# Patient Record
Sex: MALE | Race: WHITE | NOT HISPANIC OR LATINO | Employment: OTHER | URBAN - METROPOLITAN AREA
[De-identification: names, ages, dates, MRNs, and addresses within clinical notes are randomized per-mention and may not be internally consistent; named-entity substitution may affect disease eponyms.]

---

## 2018-05-24 ENCOUNTER — OFFICE VISIT (OUTPATIENT)
Dept: FAMILY MEDICINE | Facility: CLINIC | Age: 58
End: 2018-05-24
Payer: COMMERCIAL

## 2018-05-24 VITALS
HEART RATE: 56 BPM | WEIGHT: 236 LBS | RESPIRATION RATE: 17 BRPM | OXYGEN SATURATION: 97 % | TEMPERATURE: 97.6 F | HEIGHT: 73 IN | DIASTOLIC BLOOD PRESSURE: 82 MMHG | BODY MASS INDEX: 31.28 KG/M2 | SYSTOLIC BLOOD PRESSURE: 120 MMHG

## 2018-05-24 DIAGNOSIS — E66.9 OBESITY (BMI 30-39.9): ICD-10-CM

## 2018-05-24 DIAGNOSIS — K40.90 RIGHT INGUINAL HERNIA: Primary | ICD-10-CM

## 2018-05-24 PROCEDURE — 99203 OFFICE O/P NEW LOW 30 MIN: CPT | Performed by: PHYSICIAN ASSISTANT

## 2018-05-24 ASSESSMENT — PAIN SCALES - GENERAL: PAINLEVEL: NO PAIN (0)

## 2018-05-24 NOTE — PATIENT INSTRUCTIONS
Follow up with general surgery at [facility] ([phone]).       At [facility], we strive to deliver an exceptional experience to you, every time we see you.  If you receive a survey in the mail, please send us back your thoughts. We really do value your feedback.    Suggested websites for health information:  Www.Localize Direct.org : Up to date and easily searchable information on multiple topics.  Www.medlineplus.gov : medication info, interactive tutorials, watch real surgeries online  Www.familydoctor.org : good info from the Academy of Family Physicians  Www.cdc.gov : public health info, travel advisories, epidemics (H1N1)  Www.aap.org : children's health info, normal development, vaccinations  Www.health.[facility].[place].us : [place] dept of health, public health issues in [place], N1N1    Your care team:     Family Medicine   WILL Meyers MD Emily Bunt, SUDHEER [facility]   S. MD Tierra Tabares MD Angela Wermerskirchen, MD         Clinic hours: Monday - Wednesday 7 am-7 pm   Thursdays and Fridays 7 am-5 pm.     [facility]: Monday - Friday 11 am-9 pm,   Saturday and Sunday 9 am-5 pm.    [facility]: Monday -Thursday 8 am-8 pm; Friday 8 am-6 pm; Saturday and Sunday 9 am-5 pm.     [facility]: Monday Thursday 8 am   7 pm; Friday 8 am   6 pm    Clinic: [phone]   [facility]: [phone]   [facility]: [phone]

## 2018-05-24 NOTE — MR AVS SNAPSHOT
After Visit Summary   5/24/2018    Bang Donis    MRN: [id number]           Patient Information     Date Of Birth          1960        Visit Information        Provider Department      5/24/2018 2:40 PM Sanaz Hauser PA-C [facility]        Today's Diagnoses     Right inguinal hernia    -  1      Care Instructions    Follow up with general surgery at [facility] ([phone]).       At [facility], we strive to deliver an exceptional experience to you, every time we see you.  If you receive a survey in the mail, please send us back your thoughts. We really do value your feedback.    Suggested websites for health information:  Www.[facility].Skiipi : Up to date and easily searchable information on multiple topics.  Www.[employer].gov : medication info, interactive tutorials, watch real surgeries online  Www.familydoctor.org : good info from the Academy of Family Physicians  Www.cdc.gov : public health info, travel advisories, epidemics (H1N1)  Www.aap.org : children's health info, normal development, vaccinations  Www.health.[facility].[place].us : [place] dept of health, public health issues in [place], N1N1    Your care team:     Family Medicine   WILL Meyers MD Emily Bunt, APRN CNP   S. MD Tierra Tabares MD Angela Wermerskirchen, MD         Clinic hours: Monday - Wednesday 7 am-7 pm   Thursdays and Fridays 7 am-5 pm.     [facility]: Monday - Friday 11 am-9 pm,   Saturday and Sunday 9 am-5 pm.    [facility]: Monday -Thursday 8 am-8 pm; Friday 8 am-6 pm; Saturday and Sunday 9 am-5 pm.     [facility]: Monday - Thursday 8 am - 7 pm; Friday 8 am - 6 pm    Clinic: [phone]   [facility]: [phone]   [facility]: [phone]                  Follow-ups after your visit        Additional Services      "GENERAL SURG ADULT REFERRAL       Your provider has referred you to: [facility]: [facility] [phone]   [email or web address]    Please be aware that coverage of these services is subject to the terms and limitations of your health insurance plan.  Call member services at your health plan with any benefit or coverage questions.      Please bring the following with you to your appointment:    (1) Any X-Rays, CTs or MRIs which have been performed.  Contact the facility where they were done to arrange for  prior to your scheduled appointment.   (2) List of current medications   (3) This referral request   (4) Any documents/labs given to you for this referral                  Who to contact     If you have questions or need follow up information about today's clinic visit or your schedule please contact [facility] directly at [phone].  Normal or non-critical lab and imaging results will be communicated to you by MyChart, letter or phone within 4 business days after the clinic has received the results. If you do not hear from us within 7 days, please contact the clinic through [employer] or phone. If you have a critical or abnormal lab result, we will notify you by phone as soon as possible.  Submit refill requests through [employer] or call your pharmacy and they will forward the refill request to us. Please allow 3 business days for your refill to be completed.          Additional Information About Your Visit        [employer]ep Information     [employer] lets you send messages to your doctor, view your test results, renew your prescriptions, schedule appointments and more. To sign up, go to [email or web address] . Click on \"Log in\" on the left side of the screen, which will take you to the Welcome page. Then click on \"Sign up Now\" on the right side of the page.     You will be asked to enter the access code listed " "below, as well as some personal information. Please follow the directions to create your username and password.     Your access code is: [email or web address]  Expires: 2018  2:42 PM     Your access code will  in 90 days. If you need help or a new code, please call your [facility] or [phone].        Care EveryWhere ID     This is your Care EveryWhere ID. This could be used by other organizations to access your [facility] medical records  [id number]        Your Vitals Were     Pulse Temperature Respirations Height Pulse Oximetry BMI (Body Mass Index)    56 97.6  F (36.4  C) (Oral) 17 1.861 m (6' 1.25\") 97% 30.92 kg/m2       Blood Pressure from Last 3 Encounters:   18 120/82   12 134/78   12/10/12 126/78    Weight from Last 3 Encounters:   18 107 kg (236 lb)   12/10/12 118.9 kg (262 lb 3.2 oz)   12 113.4 kg (250 lb)              We Performed the Following     GENERAL SURG ADULT REFERRAL          Today's Medication Changes          These changes are accurate as of 18  2:59 PM.  If you have any questions, ask your nurse or doctor.               Stop taking these medicines if you haven't already. Please contact your care team if you have questions.     NO ACTIVE MEDICATIONS   Stopped by:  Sanaz Hauser PA-C                    Primary Care Provider Office Phone # Fax #    [facility] [phone] [address] [address]        Equal Access to Services     [facility]: Hadii michelle tyson Sodylan, waaxda luqadaha, qaybta kaalmacathy meza . So [facility] [phone].    ATENCIÓN: Si habla español, tiene a rosales disposición servicios gratuitos de asistencia lingüística. Llame al [phone].    We comply with applicable federal civil rights laws and [place] laws. We do not discriminate on the basis of race, color, national origin, age, disability, sex, sexual orientation, or gender " identity.            Thank you!     Thank you for choosing [facility]  for your care. Our goal is always to provide you with excellent care. Hearing back from our patients is one way we can continue to improve our services. Please take a few minutes to complete the written survey that you may receive in the mail after your visit with us. Thank you!             Your Updated Medication List - Protect others around you: Learn how to safely use, store and throw away your medicines at www.disposemymeds.org.      Notice  As of 5/24/2018  2:59 PM    You have not been prescribed any medications.

## 2018-05-24 NOTE — PROGRESS NOTES
SUBJECTIVE:   Bang Donis is a 58 year old male who presents to clinic today for the following health issues:      Possible Hernia    Onset: 1 1/2 weeks    Description:   Location: right groin  Character: none    Accompanying Signs & Symptoms:  Other symptoms: lump in the groin    History:   Previous similar pain: no       Precipitating factors:   Trauma or overuse: no     Alleviating factors:  Improved by: n/a    Therapies Tried and outcome: compression shorts      Compression shorts seemed  To help. Not a lot of pain with it.  Does golf and carried golf bag and some discomfort.  Unable to recall trauma or injury.  No history of surgery    Does labs through work and cholesterol and blood sugar have been normal.     Works from home.  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  Additional history: as documented    Patient Active Problem List   Diagnosis     CARDIOVASCULAR SCREENING; LDL GOAL LESS THAN 160     Past Surgical History:   Procedure Laterality Date     COLONOSCOPY  7/9/2012    Procedure: COLONOSCOPY;  Colonoscopy ;  Surgeon: Homar Leger MD;  Location:  OR       Social History   Substance Use Topics     Smoking status: Never Smoker     Smokeless tobacco: Never Used     Alcohol use Yes      Comment: occasional     Family History   Problem Relation Age of Onset     C.A.D. Father      Colon Cancer Father      in 80s     Asthma Brother      Colon Cancer Sister      63      DIABETES No family hx of      Hypertension No family hx of      CEREBROVASCULAR DISEASE No family hx of      Breast Cancer No family hx of      Cancer - colorectal No family hx of      Prostate Cancer No family hx of          No current outpatient prescriptions on file.     No Known Allergies    Reviewed and updated as needed this visit by clinical staff  Tobacco  Allergies  Meds  Med Hx  Surg Hx  Fam Hx  Soc Hx      Reviewed and updated as  "needed this visit by Provider  Tobacco  Allergies  Meds  Problems  Med Hx  Surg Hx  Fam Hx  Soc Hx          ROS:  Constitutional, HEENT, cardiovascular, pulmonary, gi and gu systems are negative, except as otherwise noted.    OBJECTIVE:     /82 (BP Location: Right arm, Patient Position: Chair, Cuff Size: Adult Large)  Pulse 56  Temp 97.6  F (36.4  C) (Oral)  Resp 17  Ht 1.861 m (6' 1.25\")  Wt 107 kg (236 lb)  SpO2 97%  BMI 30.92 kg/m2  Body mass index is 30.92 kg/(m^2).  GENERAL: healthy, alert and no distress  NECK: no adenopathy, no asymmetry, masses, or scars and thyroid normal to palpation  RESP: lungs clear to auscultation - no rales, rhonchi or wheezes  CV: regular rate and rhythm, normal S1 S2, no S3 or S4, no murmur, click or rub, no peripheral edema and peripheral pulses strong  ABDOMEN: soft, nontender, no hepatosplenomegaly, no masses and bowel sounds normal   (male): testicles normal without atrophy or masses and hernia right inguinal large easily reducible   MS: no gross musculoskeletal defects noted, no edema    Diagnostic Test Results:  none     ASSESSMENT/PLAN:         BMI:   Estimated body mass index is 30.92 kg/(m^2) as calculated from the following:    Height as of this encounter: 1.861 m (6' 1.25\").    Weight as of this encounter: 107 kg (236 lb).   Weight management plan: Discussed healthy diet and exercise guidelines and patient will follow up in 12 months in clinic to re-evaluate.      1. Right inguinal hernia  Easily reducible but large size.  Reviewed risks of strangulation and signs and symptoms warranting urgent evaluation.  He would like to postpone surgery as long as he can- at least after the summer months .  Advised that discussion is more appropriate for surgeon   - GENERAL SURG ADULT REFERRAL    2. Obesity (BMI 30-39.9)  Is very active and reports diet is good.           Sanaz Hauser PA-C  [facility]  "

## 2018-06-04 ENCOUNTER — OFFICE VISIT (OUTPATIENT)
Dept: SURGERY | Facility: CLINIC | Age: 58
End: 2018-06-04
Attending: PHYSICIAN ASSISTANT
Payer: COMMERCIAL

## 2018-06-04 ENCOUNTER — TELEPHONE (OUTPATIENT)
Dept: SURGERY | Facility: CLINIC | Age: 58
End: 2018-06-04

## 2018-06-04 VITALS
DIASTOLIC BLOOD PRESSURE: 78 MMHG | BODY MASS INDEX: 31.42 KG/M2 | HEART RATE: 62 BPM | SYSTOLIC BLOOD PRESSURE: 127 MMHG | HEIGHT: 73 IN | WEIGHT: 237.1 LBS

## 2018-06-04 DIAGNOSIS — K40.90 RIGHT INGUINAL HERNIA: Primary | ICD-10-CM

## 2018-06-04 PROCEDURE — 99243 OFF/OP CNSLTJ NEW/EST LOW 30: CPT | Performed by: SURGERY

## 2018-06-04 ASSESSMENT — PAIN SCALES - GENERAL: PAINLEVEL: MILD PAIN (2)

## 2018-06-04 NOTE — PATIENT INSTRUCTIONS
Surgery Instructions    Always follow your surgeon s instructions. If you don t, your surgery could be cancelled. Please use the following checklist.  Your surgery is on: The surgery scheduler will contact you within 1 week of your consult with the surgeon. If you do not hear from them, please call the clinic or RN Care Coordinator for your provider.    Time: Prearrival times can vary depending on location/type of surgery.  [place] - 2 hour pre-arrival  [facility] - 2 hour pre-arrival  [place] - 1 hour pre-arrival    Note:  These times may change. A nurse will call you before surgery to confirm. If you have not received a call or if you have more questions, please call us on the working day before your surgery:  ? [place]: [phone] or [phone] (9am to 5:30pm)  ? [facility]: [phone] (8am to 6pm)  ? [place]: [phone] (9am to 5pm)  ? [facility] [phone] (7am to 4pm)  Prior to surgery  ? Have a pre-op physical exam with your Primary Doctor within 30 days of surgery  - Ask your doctor to send all of your results to the surgery center/hospital before surgery. Your doctor also may ask you to bring the results with you on the day of surgery.  - Tell your doctor if:  - You are allergic to latex or rubber (latex and rubber gloves are often used in medical care).  - You are taking any medicines (including aspirin), vitamins, or herbal products. You may need to stop taking some medicines before surgery.  - You have any medical problems (allergies, diabetes, or heart disease, for example).  - You have a pacemaker or an AICD (automatic implanted cardiac defibrillator). If you do, please bring the ID card with you on the day of surgery.  - People who smoke have a higher risk of infection after surgery. Ask your doctor how you can quit smoking.  - If you Primary Doctor is not within the [employer] system, you will need to have your pre-op physical faxed to us to be scanned into your  chart.  - [place]: [phone] or [phone]  - [facility] ([place]): [phone]  - [facility] [phone]  ? Call your insurance company. Ask if you need pre-approval for your surgery. If you do not have insurance, please let us know. If you wish to speak to the , please alert the clinic staff so this can be arranged.  ? Arrange for someone to drive you home after surgery.  will need to be a responsible adult (18 years or older) that will provide transportation to and from surgery and stay in the waiting room during your surgery. You may not drive yourself or take public transportation to and from surgery.  ? Arrange for someone to stay with you for 24 hours after you go home. This person must be a responsible adult (18 years or older).  ? Call your surgeon or their nurse if there is any change in your health (cold, flu, infections, hospitalizations).  ? Do not smoke, drink alcohol, or take over-the-counter medicine for 24 hours before and after surgery.  ? If you take prescribed drugs, you may need to stop them until after the surgery.  Discuss what medications to take or not take prior to surgery with your Primary Doctor at your pre-op physical. Avoid over-the-counter blood-thinning medications such as Aspirin, Ibuprofen, vitamin E, or fish oil 7 days prior to surgery (unless otherwise directed by your Primary Doctor). Tylenol is a good alternative for mild pain relief prior to surgery.  ? Eating and drinking guidelines prior to surgery:  - Stop all solid food consumption 8 hours prior to surgery  - You may drink clear liquids up to 2 hours prior to surgery (water, fruit juices without pulp, jello, tea/coffee without creamer, sports drinks, clear-fat free broth (bouillon or consomme), popsicles (without milk, bits of fruit, or seeds/nuts)  ? Follow instructions given for showering or bathing before surgery.    - Use 8 ounces of antiseptic surgical soap,  like:  - Hibiclens, Scrub Care, or Exidine  - You can find it at your local pharmacy, clinic, or retail store. If you have trouble, ask your pharmacist to help you find the right substitute.  - Please wash with one of the above soaps twice before coming to the hospital for your surgery. This will decrease bacteria (germs) on your skin. It will also help reduce your chance of infection after surgery.  - Items you will need for showering:  - 4 newly washed washcloths  - 2 newly washed towels  - 8 ounces of one of the above soaps  - Following these instructions:  - The evening before surgery: Shower or bathe as you normally would, using your regular soap and a clean washcloth. Give special attention to places where your incision (surgical cut) or catheters will be. This includes your groin area. Rinse well. You may wash your hair with your regular shampoo. Next, wash your body with 4 ounces of the antiseptic soap. Use a clean, damp washcloth and gently clean your body (from the chin down). If your surgery involves your head, use the special soap on your head and scalp. Rinse well and dry off using a newly washed towel.  - The morning of surgery: Repeat the same process as the evening shower.  - Other suggestions:    Do not shave within 12 inches of your incision (surgical cut) area for at least 3 days before surgery. Shaving can make small cuts in the skin. This puts you at higher risk of infection.    Wear freshly washed pajamas or clothing after your evening shower.    Wear freshly washed clothes the day of surgery.    Wash and change your bed sheets the day before surgery to have clean bed sheets after your shower and when you get home from surgery.    If you have trouble washing all areas, make sure someone helps you.    Don't use any deodorant, lotion or powder after your shower.    Women who are menstruating should wear a fresh sanitary pad to the hospital.  ? Do not wear or add deodorant, cologne, lotion,  makeup, nail polish or jewelry to surgery. If you wear fake nails, please remove at least one nail before coming to surgery (an oxygen monitor needs to be placed on your finger during surgery).  ? Bring these items to the surgery center/hospital:  - Insurance card  - Money for parking and co-pays, if needed  - A list of all the medicines you take. Include vitamins, minerals, herbs, and over-the-counter drugs.  Note any drug allergies.  - A copy of your advance health care directive, if you have one. This tells us what treatment you would want--and who would make health care decisions--if you could no longer speak for yourself. You may request this form in advance or download it from [email or web address].  - A case for glasses, contact lenses, hearing aids, or dentures.  - Your inhaler or CPAP machine, if you use these at home.  ? Leave extra cash, jewelry, and other valuables at home.  When you arrive  When you get to the surgery center/hospital, you will:  ? Check in. If you are under age 18, you must be with a parent or legal guardian.  ? Sign consent forms, if you haven t already. These forms state that you know the risks and benefits of surgery. When you sign the forms, you give us permission to do the surgery. Do not sign them unless you understand what will happen during and after your surgery. If you have any questions about your surgery, ask to speak with your doctor before you sign the forms. If you don t understand the answers, ask again.  ? Receive a copy of the Patient s Bill of Rights. If you do not receive a copy, please ask for one.  ? Change into hospital clothes. Your belongings will be placed in a bag. We will return them to you after surgery.  ? Meet with the anesthesia provider. He or she will tell you what kind of anesthesia (medicine) will be used to keep you comfortable during surgery.  Remember: it s okay to remind doctors and nurses to wash their hands before touching you.  In most  cases, your surgeon will use a marker to write his or her initials on the surgery site. This ensures that the exact site is operated on.  For safety reasons, we will ask you the same questions many times. For example, we may ask your name and birth date over and over again.  Friends and family can stay with you until it s time for surgery. While you re in surgery, they will be in the waiting area. Please note that cell phones are not allowed in some patient care areas.  If you have questions about what will happen in the operating room, talk to your care team.  After surgery  We will move you to a recovery room, where we will watch you closely. If you have any pain or discomfort, tell your nurse. He or she will try to make you comfortable.  If you are staying overnight, we will move you to your hospital room after you are awake.  If you are going home, we will move you to another room. Friends and family may be able to join you. The length of time you spend in recovery depend on the type of medicine you received, your medical condition, the type of surgery you had, or your response to the anesthesia given during your procedure.  When you are discharged from the recovery room, the nurses will review instructions with you and your caregiver.  ? Please wash your hands every time you touch the wound or change bandages or dressings.  ? Do not submerge the wound in water.  You may not use a bathtub or hot tub until the wound is closed. The wait time frame is generally 2-3 weeks, but any open area can be a source of incoming bacteria, so it is better to be on the safe side and avoid water submersion until your wound is fully healed.  ? You may take a shower 24 hours after surgery. Double check with your surgeon if it is OK for water to run over the wound, whether it has been sutured, stapled, glued, or is open. You may gently wash the wound using the antiseptic soap provided for your pre-surgery showering (do not use a  washcloth). Any mild soap will work as well.  ? Many surgical wounds will have small white strips of tape on them called steri-strips.  Do not remove these. The edges will curl and fall off within 7-10 days with normal showering.  ? If you are going home with sutures (stitches) or staples, you must return to the clinic to have them taken out, usually within 1-2 weeks. Some stitches are dissolvable and do not require removal. Make sure to clarify with your surgeon or surgery nurse reviewing discharge paperwork what kind of sutures you have.  ? Signs and symptoms of infection include:  - Fever, temperature over 101.5   F  - Redness  - Swelling  - Increased pain  - Green or yellow drainage which may or may not have a foul odor  Dealing with pain  A nurse will check your comfort level often during your stay. He or she will work with you to manage your pain.  Remember:  ? All pain is real. There are many ways to control pain. We can help you decide what works best for you.  ? Ask for pain medicine when you need it. Don t try to  tough it out --this can make you feel worse. Always take your medicine as ordered.  ? Medicine doesn t work the same for everyone. If your medicine isn t working, tell your nurse. There may be other medicines or treatments we can try.  Going home  We will let you know when you re ready to leave the surgery center or hospital. Before you leave, we will tell you how to care for yourself at home and prevent infections. If you do not understand something, please say so. We will answer any questions you have. We will then help you get ready to leave.  Remember, you must have a responsible adult (18 years or older) to stay with you 24 hours after you leave the hospital.  Take it easy when you get home. You will need some time to recover--you may be more tired than you realize at first. Rest and relax for at least the first 24 hours at home. You ll feel better and heal faster if you take good care of  yourself.  Follow the discharge instructions that are given to you when you leave the surgery center or hospital  Please call the clinic if you experience any problems during regular clinic hours (Monday-Friday 8:00am-5:00pm).  If you experience problems during non-clinic hours, please call the [facility] on-call line at [phone] and ask the  to page the on-call Provider for your specialty. The on-call Provider will call you back and can triage your symptoms and further advise. If you are having an emergency, always call 911 or seek immediate evaluation at the Emergency Room.  Locations  [facility]  Same-day surgery center - 2nd floor, check-in #5  [address] [place] [address]  [phone]  [email or web address] [place]  [address] [place] [id number]  [phone]   [email or web address]

## 2018-06-04 NOTE — MR AVS SNAPSHOT
After Visit Summary   6/4/2018    Bang Donis    MRN: [id number]           Patient Information     Date Of Birth          1960        Visit Information        Provider Department      6/4/2018 3:30 PM Jenna Akins MD [facility]        Care Instructions    Surgery Instructions    Always follow your surgeon s instructions. If you don t, your surgery could be cancelled. Please use the following checklist.  Your surgery is on: The surgery scheduler will contact you within 1 week of your consult with the surgeon. If you do not hear from them, please call the clinic or RN Care Coordinator for your provider.    Time: Prearrival times can vary depending on location/type of surgery.  [place] - 2 hour pre-arrival  [facility] [place], [place] - 2 hour pre-arrival  [place] - 1 hour pre-arrival    Note:  These times may change. A nurse will call you before surgery to confirm. If you have not received a call or if you have more questions, please call us on the working day before your surgery:  ? [place]: [phone] or [phone] (9am to 5:30pm)  ? [facility] [place] [place]: [phone] (8am to 6pm)  ? [place]: [phone] (9am to 5pm)  ? [facility] [phone] (7am to 4pm)  Prior to surgery  ? Have a pre-op physical exam with your Primary Doctor within 30 days of surgery  - Ask your doctor to send all of your results to the surgery center/hospital before surgery. Your doctor also may ask you to bring the results with you on the day of surgery.  - Tell your doctor if:  - You are allergic to latex or rubber (latex and rubber gloves are often used in medical care).  - You are taking any medicines (including aspirin), vitamins, or herbal products. You may need to stop taking some medicines before surgery.  - You have any medical problems (allergies, diabetes, or heart disease, for example).  - You have a pacemaker or an AICD (automatic implanted cardiac defibrillator). If you do, please  bring the ID card with you on the day of surgery.  - People who smoke have a higher risk of infection after surgery. Ask your doctor how you can quit smoking.  - If you Primary Doctor is not within the [employer] system, you will need to have your pre-op physical faxed to us to be scanned into your chart.  - [place]: [phone] or [phone]  - [facility] ([place]): [phone]  - [facility] ([facility]): [phone]  ? Call your insurance company. Ask if you need pre-approval for your surgery. If you do not have insurance, please let us know. If you wish to speak to the , please alert the clinic staff so this can be arranged.  ? Arrange for someone to drive you home after surgery.  will need to be a responsible adult (18 years or older) that will provide transportation to and from surgery and stay in the waiting room during your surgery. You may not drive yourself or take public transportation to and from surgery.  ? Arrange for someone to stay with you for 24 hours after you go home. This person must be a responsible adult (18 years or older).  ? Call your surgeon or their nurse if there is any change in your health (cold, flu, infections, hospitalizations).  ? Do not smoke, drink alcohol, or take over-the-counter medicine for 24 hours before and after surgery.  ? If you take prescribed drugs, you may need to stop them until after the surgery.  Discuss what medications to take or not take prior to surgery with your Primary Doctor at your pre-op physical. Avoid over-the-counter blood-thinning medications such as Aspirin, Ibuprofen, vitamin E, or fish oil 7 days prior to surgery (unless otherwise directed by your Primary Doctor). Tylenol is a good alternative for mild pain relief prior to surgery.  ? Eating and drinking guidelines prior to surgery:  - Stop all solid food consumption 8 hours prior to surgery  - You may drink clear liquids up to 2 hours prior to surgery  (water, fruit juices without pulp, jello, tea/coffee without creamer, sports drinks, clear-fat free broth (bouillon or consomme), popsicles (without milk, bits of fruit, or seeds/nuts)  ? Follow instructions given for showering or bathing before surgery.    - Use 8 ounces of antiseptic surgical soap, like:  - Hibiclens, Scrub Care, or Exidine  - You can find it at your local pharmacy, clinic, or retail store. If you have trouble, ask your pharmacist to help you find the right substitute.  - Please wash with one of the above soaps twice before coming to the hospital for your surgery. This will decrease bacteria (germs) on your skin. It will also help reduce your chance of infection after surgery.  - Items you will need for showering:  - 4 newly washed washcloths  - 2 newly washed towels  - 8 ounces of one of the above soaps  - Following these instructions:  - The evening before surgery: Shower or bathe as you normally would, using your regular soap and a clean washcloth. Give special attention to places where your incision (surgical cut) or catheters will be. This includes your groin area. Rinse well. You may wash your hair with your regular shampoo. Next, wash your body with 4 ounces of the antiseptic soap. Use a clean, damp washcloth and gently clean your body (from the chin down). If your surgery involves your head, use the special soap on your head and scalp. Rinse well and dry off using a newly washed towel.  - The morning of surgery: Repeat the same process as the evening shower.  - Other suggestions:    Do not shave within 12 inches of your incision (surgical cut) area for at least 3 days before surgery. Shaving can make small cuts in the skin. This puts you at higher risk of infection.    Wear freshly washed pajamas or clothing after your evening shower.    Wear freshly washed clothes the day of surgery.    Wash and change your bed sheets the day before surgery to have clean bed sheets after your shower and  when you get home from surgery.    If you have trouble washing all areas, make sure someone helps you.    Don't use any deodorant, lotion or powder after your shower.    Women who are menstruating should wear a fresh sanitary pad to the hospital.  ? Do not wear or add deodorant, cologne, lotion, makeup, nail polish or jewelry to surgery. If you wear fake nails, please remove at least one nail before coming to surgery (an oxygen monitor needs to be placed on your finger during surgery).  ? Bring these items to the surgery center/hospital:  - Insurance card  - Money for parking and co-pays, if needed  - A list of all the medicines you take. Include vitamins, minerals, herbs, and over-the-counter drugs.  Note any drug allergies.  - A copy of your advance health care directive, if you have one. This tells us what treatment you would want--and who would make health care decisions--if you could no longer speak for yourself. You may request this form in advance or download it from [email or web address].  - A case for glasses, contact lenses, hearing aids, or dentures.  - Your inhaler or CPAP machine, if you use these at home.  ? Leave extra cash, jewelry, and other valuables at home.  When you arrive  When you get to the surgery center/hospital, you will:  ? Check in. If you are under age 18, you must be with a parent or legal guardian.  ? Sign consent forms, if you haven t already. These forms state that you know the risks and benefits of surgery. When you sign the forms, you give us permission to do the surgery. Do not sign them unless you understand what will happen during and after your surgery. If you have any questions about your surgery, ask to speak with your doctor before you sign the forms. If you don t understand the answers, ask again.  ? Receive a copy of the Patient s Bill of Rights. If you do not receive a copy, please ask for one.  ? Change into hospital clothes. Your belongings will be placed in a bag.  We will return them to you after surgery.  ? Meet with the anesthesia provider. He or she will tell you what kind of anesthesia (medicine) will be used to keep you comfortable during surgery.  Remember: it s okay to remind doctors and nurses to wash their hands before touching you.  In most cases, your surgeon will use a marker to write his or her initials on the surgery site. This ensures that the exact site is operated on.  For safety reasons, we will ask you the same questions many times. For example, we may ask your name and birth date over and over again.  Friends and family can stay with you until it s time for surgery. While you re in surgery, they will be in the waiting area. Please note that cell phones are not allowed in some patient care areas.  If you have questions about what will happen in the operating room, talk to your care team.  After surgery  We will move you to a recovery room, where we will watch you closely. If you have any pain or discomfort, tell your nurse. He or she will try to make you comfortable.  If you are staying overnight, we will move you to your hospital room after you are awake.  If you are going home, we will move you to another room. Friends and family may be able to join you. The length of time you spend in recovery depend on the type of medicine you received, your medical condition, the type of surgery you had, or your response to the anesthesia given during your procedure.  When you are discharged from the recovery room, the nurses will review instructions with you and your caregiver.  ? Please wash your hands every time you touch the wound or change bandages or dressings.  ? Do not submerge the wound in water.  You may not use a bathtub or hot tub until the wound is closed. The wait time frame is generally 2-3 weeks, but any open area can be a source of incoming bacteria, so it is better to be on the safe side and avoid water submersion until your wound is fully  healed.  ? You may take a shower 24 hours after surgery. Double check with your surgeon if it is OK for water to run over the wound, whether it has been sutured, stapled, glued, or is open. You may gently wash the wound using the antiseptic soap provided for your pre-surgery showering (do not use a washcloth). Any mild soap will work as well.  ? Many surgical wounds will have small white strips of tape on them called steri-strips.  Do not remove these. The edges will curl and fall off within 7-10 days with normal showering.  ? If you are going home with sutures (stitches) or staples, you must return to the clinic to have them taken out, usually within 1-2 weeks. Some stitches are dissolvable and do not require removal. Make sure to clarify with your surgeon or surgery nurse reviewing discharge paperwork what kind of sutures you have.  ? Signs and symptoms of infection include:  - Fever, temperature over 101.5   F  - Redness  - Swelling  - Increased pain  - Green or yellow drainage which may or may not have a foul odor  Dealing with pain  A nurse will check your comfort level often during your stay. He or she will work with you to manage your pain.  Remember:  ? All pain is real. There are many ways to control pain. We can help you decide what works best for you.  ? Ask for pain medicine when you need it. Don t try to  tough it out --this can make you feel worse. Always take your medicine as ordered.  ? Medicine doesn t work the same for everyone. If your medicine isn t working, tell your nurse. There may be other medicines or treatments we can try.  Going home  We will let you know when you re ready to leave the surgery center or hospital. Before you leave, we will tell you how to care for yourself at home and prevent infections. If you do not understand something, please say so. We will answer any questions you have. We will then help you get ready to leave.  Remember, you must have a responsible adult (18 years or  older) to stay with you 24 hours after you leave the hospital.  Take it easy when you get home. You will need some time to recover--you may be more tired than you realize at first. Rest and relax for at least the first 24 hours at home. You ll feel better and heal faster if you take good care of yourself.  Follow the discharge instructions that are given to you when you leave the surgery center or hospital  Please call the clinic if you experience any problems during regular clinic hours (Monday-Friday 8:00am-5:00pm).  If you experience problems during non-clinic hours, please call the [facility] on-call line at [phone] and ask the  to page the on-call Provider for your specialty. The on-call Provider will call you back and can triage your symptoms and further advise. If you are having an emergency, always call 911 or seek immediate evaluation at the Emergency Room.  Locations  [facility]  Same-day surgery center - 2nd floor, check-in #4 [address] [place] [address]  [phone]  [email or web address] ([facility])  [address] [place] [id number] [phone]   [email or web address]                    Follow-ups after your visit        Who to contact     If you have questions or need follow up information about today's clinic visit or your schedule please contact [facility] directly at [phone].  Normal or non-critical lab and imaging results will be communicated to you by MyChart, letter or phone within 4 business days after the clinic has received the results. If you do not hear from us within 7 days, please contact the clinic through MyChart or phone. If you have a critical or abnormal lab result, we will notify you by phone as soon as possible.  Submit refill requests through [employer] or call your pharmacy  "and they will forward the refill request to us. Please allow 3 business days for your refill to be completed.          Additional Information About Your Visit        [employer] Information     [employer] gives you secure access to your electronic health record. If you see a primary care provider, you can also send messages to your care team and make appointments. If you have questions, please call your primary care clinic.  If you do not have a primary care provider, please call [phone] and they will assist you.      [employer] is an electronic gateway that provides easy, online access to your medical records. With [employer], you can request a clinic appointment, read your test results, renew a prescription or communicate with your care team.     To access your existing account, please contact your [facility] or call [phone] for assistance.        Care EveryWhere ID     This is your Care EveryWhere ID. This could be used by other organizations to access your [facility] medical records  [id number]        Your Vitals Were     Pulse Height BMI (Body Mass Index)             62 1.861 m (6' 1.25\") 31.07 kg/m2          Blood Pressure from Last 3 Encounters:   06/04/18 127/78   05/24/18 120/82   12/12/12 134/78    Weight from Last 3 Encounters:   06/04/18 107.5 kg (237 lb 1.6 oz)   05/24/18 107 kg (236 lb)   12/10/12 118.9 kg (262 lb 3.2 oz)              Today, you had the following     No orders found for display       Primary Care Provider Office Phone # Fax #    [facility] [phone]       [address] [place] [id number]        Equal Access to Services     GÓMEZ LUIS : Hadii aad ku hadasho Soomaali, waaxda luqadaha, qaybta kaalmada adememe, cathy chong. So [facility] [phone].    ATENCIÓN: Si habla español, tiene a rosales disposición servicios gratuitos de asistencia lingüística. Llame al [phone].    We comply with " applicable federal civil rights laws and [place] laws. We do not discriminate on the basis of race, color, national origin, age, disability, sex, sexual orientation, or gender identity.            Thank you!     Thank you for choosing [facility]  for your care. Our goal is always to provide you with excellent care. Hearing back from our patients is one way we can continue to improve our services. Please take a few minutes to complete the written survey that you may receive in the mail after your visit with us. Thank you!             Your Updated Medication List - Protect others around you: Learn how to safely use, store and throw away your medicines at www.disposemymeds.org.      Notice  As of 6/4/2018  3:48 PM    You have not been prescribed any medications.

## 2018-06-04 NOTE — TELEPHONE ENCOUNTER
Procedure name(s) - multi select laparoscopic right inguinal hernia repair, possible bilateral    Is this a multi surgeon case? No    Laterality Right    Reason for procedure Symptomatic right inguinal hernia    Urgency of Surgery Patient Choice/Next Available    Location of Case: [facility]    Surgeon Procedure Time (incision to closure) in minutes (per procedure as applicable) 90    Note:  Surgical Case Time Needed (in minutes)   Patient Class (for admit prior to surgery, specify number of days in comments): Same day (surgery center outpatient)    Anesthesia General    Post-Op Appointment 4 weeks    Vendor Needed? No      BMI- 31.33.     Amorrrobert Womack, CMA

## 2018-06-04 NOTE — LETTER
6/4/2018         RE: Bang Donis  [address]  [place] [address]        Dear Colleague,    Thank you for referring your patient, Bang Donis, to the [facility]. Please see a copy of my visit note below.    Chief Complaint: right groin bulge    History of Present Illness:   58 year old male sent in consultation by Sanaz Hauser PA-C for evaluation of a right groin protrusion which was noted about 3 weeks ago. He had minimal pain, and had noted some discomfort with carrying his golf bag. This was partly alleviated with the use of compression shorts. Over the last weekend, when he was busy and active all day, he noted increasnig burning discomfort which was alleviated with rest.  He denies any radiation of the pain and denies any episodes of incarceration.       No past medical history on file.  Past Surgical History:   Procedure Laterality Date     COLONOSCOPY  7/9/2012    Procedure: COLONOSCOPY;  Colonoscopy ;  Surgeon: Homar Leger MD;  Location:  OR     No current outpatient prescriptions on file.     No current facility-administered medications for this visit.       No Known Allergies  Social History     Social History     Marital status:      Spouse name: N/A     Number of children: N/A     Years of education: N/A     Occupational History     computer programer [employer]     Social History Main Topics     Smoking status: Never Smoker     Smokeless tobacco: Never Used     Alcohol use Yes      Comment: occasional     Drug use: No     Sexual activity: Yes     Partners: Female     Other Topics Concern      Service No     Blood Transfusions No     Caffeine Concern No     Occupational Exposure No     Hobby Hazards No     Sleep Concern No     Stress Concern No     Weight Concern No     Special Diet No     Back Care No     Exercise Yes     4-5 times per week     Seat Belt Yes     Self-Exams No     Social History Narrative     Family History  "  Problem Relation Age of Onset     C.A.D. Father      Colon Cancer Father      in 80s     Asthma Brother      Colon Cancer Sister      63      DIABETES No family hx of      Hypertension No family hx of      CEREBROVASCULAR DISEASE No family hx of      Breast Cancer No family hx of      Cancer - colorectal No family hx of      Prostate Cancer No family hx of            Review of Systems:  No chest pain, dyspnea, fevers or night sweats. Lost 60 lbs several years ago, and has maintained it  All other systems questioned and negative.     Exam:  Vital signs for exam: /78  Pulse 62  Ht 1.861 m (6' 1.25\")  Wt 107.5 kg (237 lb 1.6 oz)  BMI 31.07 kg/m2  Constitutional: healthy, alert and no distress.  Head: Normocephalic. No masses, lesions, tenderness or abnormalities.  ENT: ENT exam normal, no neck nodes or sinus tenderness.  Cardiovascular: Normal S1, S2, no murmurs  Respiratory: Clear to auscultation.   Gastrointestinal:  Abdomen soft, non-tender. No masses, organomegaly.  : reducible right inguinal hernia, normal testicular exam  Musculoskeletal: extremities normal- no gross deformities noted and normal muscle tone  Skin: no jaundice, rashes or petechiae.   Neurologic: Gait normal.  Psychiatric: Mentation appears normal and affect normal.    IMPRESSION AND PLAN:  Symptomatic right inguinal hernia in an active healthy man. We discussed options and he would like to proceed with a laparoscopic inguinal hernia repair. Risks of the procedure including visceral injury, bleeding, infection, conversion to open, recurrence, chronic groin pain, contralateral exploration and mesh placement explained to the patient who understands and is willing to proceed.      Again, thank you for allowing me to participate in the care of your patient.        Sincerely,        Jenna Akins MD    "

## 2018-06-04 NOTE — NURSING NOTE
"Bang Donis's goals for this visit include:   Chief Complaint   Patient presents with     Consult     Hernia- questions with exercise        He requests these members of his care team be copied on today's visit information: yes     PCP: [facility]    Referring Provider:  Sanaz Hauser PA-C  [address] [place] [address]    /78  Pulse 62  Ht 1.861 m (6' 1.25\")  Wt 107.5 kg (237 lb 1.6 oz)  BMI 31.07 kg/m2    Do you need any medication refills at today's visit? No     Amorrae COCO Womack        "

## 2018-06-06 NOTE — TELEPHONE ENCOUNTER
Date Scheduled: 6-18-18  Facility: [facility]  Surgeon: Dr. Akins   Post-op appointment scheduled:    Jul 30, 2018  3:00 PM CDT   Return Visit with Jenna Akins MD   [facility] ([facility]    [id number] [place] [id number]   [phone]                 scheduled?: No  Surgery packet/instructions confirmed received?  Yes  Special Considerations:   Siria Keene, Surgery Scheduling Coordinator

## 2018-06-08 ENCOUNTER — OFFICE VISIT (OUTPATIENT)
Dept: FAMILY MEDICINE | Facility: CLINIC | Age: 58
End: 2018-06-08
Payer: COMMERCIAL

## 2018-06-08 ENCOUNTER — RADIANT APPOINTMENT (OUTPATIENT)
Dept: CARDIOLOGY | Facility: CLINIC | Age: 58
End: 2018-06-08
Attending: NURSE PRACTITIONER
Payer: COMMERCIAL

## 2018-06-08 VITALS
BODY MASS INDEX: 31.54 KG/M2 | TEMPERATURE: 97.7 F | OXYGEN SATURATION: 99 % | WEIGHT: 238 LBS | RESPIRATION RATE: 16 BRPM | HEART RATE: 54 BPM | DIASTOLIC BLOOD PRESSURE: 80 MMHG | HEIGHT: 73 IN | SYSTOLIC BLOOD PRESSURE: 120 MMHG

## 2018-06-08 DIAGNOSIS — Z13.220 SCREENING FOR CHOLESTEROL LEVEL: ICD-10-CM

## 2018-06-08 DIAGNOSIS — R35.0 URINARY FREQUENCY: ICD-10-CM

## 2018-06-08 DIAGNOSIS — K40.90 RIGHT GROIN HERNIA: ICD-10-CM

## 2018-06-08 DIAGNOSIS — I77.810 MILD ASCENDING AORTA DILATATION (H): ICD-10-CM

## 2018-06-08 DIAGNOSIS — I44.0 FIRST DEGREE AV BLOCK: ICD-10-CM

## 2018-06-08 DIAGNOSIS — Z01.818 PREOP GENERAL PHYSICAL EXAM: Primary | ICD-10-CM

## 2018-06-08 DIAGNOSIS — I34.0 MITRAL VALVE INSUFFICIENCY, UNSPECIFIED ETIOLOGY: Primary | ICD-10-CM

## 2018-06-08 LAB
ANION GAP SERPL CALCULATED.3IONS-SCNC: 8 MMOL/L (ref 3–14)
BUN SERPL-MCNC: 20 MG/DL (ref 7–30)
CALCIUM SERPL-MCNC: 9 MG/DL (ref 8.5–10.1)
CHLORIDE SERPL-SCNC: 105 MMOL/L (ref 94–109)
CHOLEST SERPL-MCNC: 195 MG/DL
CO2 SERPL-SCNC: 27 MMOL/L (ref 20–32)
CREAT SERPL-MCNC: 0.94 MG/DL (ref 0.66–1.25)
ERYTHROCYTE [DISTWIDTH] IN BLOOD BY AUTOMATED COUNT: 12.9 % (ref 10–15)
GFR SERPL CREATININE-BSD FRML MDRD: 82 ML/MIN/1.7M2
GLUCOSE SERPL-MCNC: 93 MG/DL (ref 70–99)
HCT VFR BLD AUTO: 41.4 % (ref 40–53)
HDLC SERPL-MCNC: 43 MG/DL
HGB BLD-MCNC: 14.3 G/DL (ref 13.3–17.7)
LDLC SERPL CALC-MCNC: 135 MG/DL
MCH RBC QN AUTO: 31.6 PG (ref 26.5–33)
MCHC RBC AUTO-ENTMCNC: 34.5 G/DL (ref 31.5–36.5)
MCV RBC AUTO: 91 FL (ref 78–100)
NONHDLC SERPL-MCNC: 152 MG/DL
PLATELET # BLD AUTO: 189 10E9/L (ref 150–450)
POTASSIUM SERPL-SCNC: 4.3 MMOL/L (ref 3.4–5.3)
PSA SERPL-ACNC: 0.42 UG/L (ref 0–4)
RBC # BLD AUTO: 4.53 10E12/L (ref 4.4–5.9)
SODIUM SERPL-SCNC: 140 MMOL/L (ref 133–144)
TRIGL SERPL-MCNC: 85 MG/DL
TSH SERPL DL<=0.005 MIU/L-ACNC: 2.03 MU/L (ref 0.4–4)
WBC # BLD AUTO: 6 10E9/L (ref 4–11)

## 2018-06-08 PROCEDURE — 93000 ELECTROCARDIOGRAM COMPLETE: CPT | Performed by: NURSE PRACTITIONER

## 2018-06-08 PROCEDURE — 84443 ASSAY THYROID STIM HORMONE: CPT | Performed by: NURSE PRACTITIONER

## 2018-06-08 PROCEDURE — 36415 COLL VENOUS BLD VENIPUNCTURE: CPT | Performed by: NURSE PRACTITIONER

## 2018-06-08 PROCEDURE — 93306 TTE W/DOPPLER COMPLETE: CPT

## 2018-06-08 PROCEDURE — 85027 COMPLETE CBC AUTOMATED: CPT | Performed by: NURSE PRACTITIONER

## 2018-06-08 PROCEDURE — G0103 PSA SCREENING: HCPCS | Performed by: NURSE PRACTITIONER

## 2018-06-08 PROCEDURE — 99214 OFFICE O/P EST MOD 30 MIN: CPT | Performed by: NURSE PRACTITIONER

## 2018-06-08 PROCEDURE — 80061 LIPID PANEL: CPT | Performed by: NURSE PRACTITIONER

## 2018-06-08 PROCEDURE — 80048 BASIC METABOLIC PNL TOTAL CA: CPT | Performed by: NURSE PRACTITIONER

## 2018-06-08 ASSESSMENT — PAIN SCALES - GENERAL: PAINLEVEL: NO PAIN (0)

## 2018-06-08 NOTE — PATIENT INSTRUCTIONS
Before Your Surgery      Call your surgeon if there is any change in your health. This includes signs of a cold or flu (such as a sore throat, runny nose, cough, rash or fever).    Do not smoke, drink alcohol or take over the counter medicine (unless your surgeon or primary care doctor tells you to) for the 24 hours before and after surgery.    If you take prescribed drugs: Follow your doctor s orders about which medicines to take and which to stop until after surgery.    Eating and drinking prior to surgery: follow the instructions from your surgeon  Take a shower or bath the night before surgery. Use the soap your surgeon gave you to gently clean your skin. If you do not have soap from your surgeon, use your regular soap. Do not shave or scrub the surgery site.  Wear clean pajamas and have clean sheets on your bed.   Understanding First-Degree Heart Block    Heart block is a condition in which the electrical system of the heart does not work properly. Sometimes it can result in a slow heartbeat that is either regular or irregular. This may cause symptoms. There are different types of heart block that can be more or less serious. First-degree heart block is a condition in which the wiring of the heart is slow to send electrical signals but all of the signals are able to pass successfully. There is no electrical block but rather a slowing or delay of the signal. It usually does not cause problems. Often it does not need treatment.  What causes first-degree heart block?  First-degree heart block may be caused by:  Natural aging process  Damage to the heart from surgery  Damage to the heart muscle from a heart attack  Other types of heart disease that damage the heart muscle  Low thyroid levels  Electrolyte abnormalities  Inflammatory or infectious heart conditions  Other diseases, including rheumatic fever and sarcoidosis  Some medicines  In addition, well-conditioned athletes may develop first-degree heart block  from heart changes that result from exercising a lot. This is considered normal. Some babies are born with heart block. Heart block may also run in families.  What are the symptoms of first-degree heart block?  First-degree heart block often does not have any symptoms. It may be found when your healthcare provider is examining you for some other reason.  In more severe cases, people may have an uncomfortable awareness of the heartbeat.   How is first-degree heart block treated?  First-degree heart block usually doesn t need treatment. Your healthcare provider may ask you to have regular follow-up visits. You may also be asked to take your own pulse and be alert to changes in your heart rate.  What are the  complications of first-degree heart block?  In rare instances, a first-degree heart block may develop into a more serious type of heart block that results in slower heartbeats. This may cause symptoms.  When should I call my healthcare provider?  Call your healthcare provider right away if you have any of these:  Unusual tiredness  Shortness of breath  Chest pain  Weakness, dizziness, or fainting  Unusual drowsiness or confusion  Pain that gets worse  Symptoms that don t get better with treatment, or symptoms that get worse  New symptoms   Date Last Reviewed: 5/1/2016 2000-2017 [employer]. [address] [place] [address], [place] [address]. All rights reserved. This information is not intended as a substitute for professional medical care. Always follow your healthcare professional's instructions.

## 2018-06-08 NOTE — PROGRESS NOTES
[facility]  [address] [place] [address]  [phone]  Dept: [phone]    PRE-OP EVALUATION:  Today's date: 2018    Bang Donis (: 1960) presents for pre-operative evaluation assessment as requested by Dr. Akins.  He requires evaluation and anesthesia risk assessment prior to undergoing surgery/procedure for treatment of hernia.    Fax number for surgical facility: [facility]  Primary Physician: [facility]  Type of Anesthesia Anticipated: General    Patient has a Health Care Directive or Living Will:  NO    Preop Questions 2018   Who is doing your surgery? Jenna Akins   What are you having done? Hernia Operation   Facility or Hospital where procedure/surgery will be performed: [facility]   1.  Do you have a history of Heart attack, stroke, stent, coronary bypass surgery, or other heart surgery? No   2.  Do you ever have any pain or discomfort in your chest? No   3.  Do you have a history of  Heart Failure? No   4.   Are you troubled by shortness of breath when:  walking on a level surface, or up a slight hill, or at night? No   5.  Do you currently have a cold, bronchitis or other respiratory infection? No   6.  Do you have a cough, shortness of breath, or wheezing? No   7.  Do you sometimes get pains in the calves of your legs when you walk? No   8. Do you or anyone in your family have previous history of blood clots? No   9.  Do you or does anyone in your family have a serious bleeding problem such as prolonged bleeding following surgeries or cuts? No   10. Have you ever had problems with anemia or been told to take iron pills? No   11. Have you had any abnormal blood loss such as black, tarry or bloody stools? No   12. Have you ever had a blood transfusion? No   13. Have you or any of your relatives ever had problems with anesthesia? No   14. Do you have sleep apnea, excessive snoring or daytime  drowsiness? No   15. Do you have any prosthetic heart valves? No   16. Do you have prosthetic joints? No         HPI:     HPI related to upcoming procedure: right groin pain, worsens with activity.     Not good sleeper. Always has been.  Urinating more at night.   Rides a bike a lot, jogs at times, elliptical at the gym.       MEDICAL HISTORY:     Patient Active Problem List    Diagnosis Date Noted     First degree AV block 06/08/2018     Priority: Medium     Right groin hernia 06/08/2018     Priority: Medium     Urinary frequency 06/08/2018     Priority: Medium     Obesity (BMI 30-39.9) 05/24/2018     Priority: Medium     CARDIOVASCULAR SCREENING; LDL GOAL LESS THAN 160 01/31/2011     Priority: Medium      History reviewed. No pertinent past medical history.  Past Surgical History:   Procedure Laterality Date     COLONOSCOPY  7/9/2012    Procedure: COLONOSCOPY;  Colonoscopy ;  Surgeon: Homar Leger MD;  Location: [facility] OR     No current outpatient prescriptions on file.     OTC products: None, except as noted above    No Known Allergies   Latex Allergy: NO    Social History   Substance Use Topics     Smoking status: Never Smoker     Smokeless tobacco: Never Used     Alcohol use Yes      Comment: occasional     History   Drug Use No       REVIEW OF SYSTEMS:   CONSTITUTIONAL: NEGATIVE for fever, chills, change in weight  INTEGUMENTARY/SKIN: NEGATIVE for worrisome rashes, moles or lesions  EYES: NEGATIVE for vision changes or irritation  ENT/MOUTH: NEGATIVE for ear, mouth and throat problems  RESP: NEGATIVE for significant cough or SOB  BREAST: NEGATIVE for masses, tenderness or discharge  CV: NEGATIVE for chest pain, palpitations or peripheral edema  GI: NEGATIVE for nausea, abdominal pain, heartburn, or change in bowel habits  : NEGATIVE for frequency, dysuria, or hematuria  MUSCULOSKELETAL: NEGATIVE for significant arthralgias or myalgia  NEURO: NEGATIVE for weakness, dizziness or paresthesias  ENDOCRINE:  "NEGATIVE for temperature intolerance, skin/hair changes  HEME: NEGATIVE for bleeding problems  PSYCHIATRIC: NEGATIVE for changes in mood or affect    EXAM:   /80 (BP Location: Right arm, Patient Position: Sitting, Cuff Size: Adult Regular)  Pulse 54  Temp 97.7  F (36.5  C) (Oral)  Resp 16  Ht 1.861 m (6' 1.25\")  Wt 108 kg (238 lb)  SpO2 99%  BMI 31.19 kg/m2    GENERAL APPEARANCE: healthy, alert and no distress     EYES: EOMI,  PERRL     HENT: ear canals and TM's normal and nose and mouth without ulcers or lesions     NECK: no adenopathy, no asymmetry, masses, or scars and thyroid normal to palpation     RESP: lungs clear to auscultation - no rales, rhonchi or wheezes     CV: regular rates and rhythm, normal S1 S2, no S3 or S4 and no murmur, click or rub     ABDOMEN:  soft, nontender, no HSM or masses and bowel sounds normal     MS: extremities normal- no gross deformities noted, no evidence of inflammation in joints, FROM in all extremities. No groin pain today     SKIN: no suspicious lesions or rashes     NEURO: Normal strength and tone, sensory exam grossly normal, mentation intact and speech normal     PSYCH: mentation appears normal. and affect normal/bright     LYMPHATICS: No cervical adenopathy    DIAGNOSTICS:     EKG: sinus bradycardia, first degree AVB, slight widened QRS  Labs Resulted Today:   Results for orders placed or performed in visit on 06/08/18   CBC with platelets   Result Value Ref Range    WBC 6.0 4.0 - 11.0 10e9/L    RBC Count 4.53 4.4 - 5.9 10e12/L    Hemoglobin 14.3 13.3 - 17.7 g/dL    Hematocrit 41.4 40.0 - 53.0 %    MCV 91 78 - 100 fl    MCH 31.6 26.5 - 33.0 pg    MCHC 34.5 31.5 - 36.5 g/dL    RDW 12.9 10.0 - 15.0 %    Platelet Count 189 150 - 450 10e9/L   EKG 12-lead complete w/read - Clinics    Impression    SB with 1st Deg AVB, slight QRS widening, non-specific. No previous EKG to compare to.  Jocelyn Greene, APRN, NP-C         No results for input(s): HGB, PLT, INR, NA, " POTASSIUM, CR, A1C in the last 37691 hours.     IMPRESSION:   Reason for surgery/procedure: groin pain  Diagnosis/reason for consult: right groin hernia    The proposed surgical procedure is considered LOW risk.    REVISED CARDIAC RISK INDEX  The patient has the following serious cardiovascular risks for perioperative complications such as (MI, PE, VFib and 3  AV Block): He has new first degree AVB  No serious cardiac risks  INTERPRETATION: 1 risks: Class II (low risk - 0.9% complication rate)    The patient has the following additional risks for perioperative complications:  No identified additional risks      ICD-10-CM    1. Preop general physical exam Z01.818 EKG 12-lead complete w/read - Clinics     CBC with platelets     Basic metabolic panel   2. Right groin hernia K40.90    3. First degree AV block I44.0 Echocardiogram Complete     TSH with free T4 reflex   4. Urinary frequency R35.0 PSA, screen   5. Screening for cholesterol level Z13.220 Lipid panel reflex to direct LDL Fasting       RECOMMENDATIONS:       Cardiovascular Risk  New first degree AVB, will get ECHO prior to surgery, if normal okay to continue surgery, if not will need cardiology consult prior to surgery      --Patient is to take all scheduled medications on the day of surgery EXCEPT for modifications listed below. none    APPROVAL GIVEN to proceed with proposed procedure,   given ECHO is normal         Signed Electronically by: SUDHEER Manriquez, NP-C      Copy of this evaluation report is provided to requesting physician.    [place] Preop Guidelines    Revised Cardiac Risk Index

## 2018-06-08 NOTE — Clinical Note
Pre-op FYI: needs ECHO to ensure normal heart function prior to surgery. New first degree AVB noted.

## 2018-06-08 NOTE — MR AVS SNAPSHOT
After Visit Summary   6/8/2018    Bang Donis    MRN: [id number]           Patient Information     Date Of Birth          1960        Visit Information        Provider Department      6/8/2018 7:40 AM Jocelyn Greene NP [facility]        Today's Diagnoses     Preop general physical exam    -  1    Right groin hernia        First degree AV block        Urinary frequency        Screening for cholesterol level          Care Instructions      Before Your Surgery      Call your surgeon if there is any change in your health. This includes signs of a cold or flu (such as a sore throat, runny nose, cough, rash or fever).    Do not smoke, drink alcohol or take over the counter medicine (unless your surgeon or primary care doctor tells you to) for the 24 hours before and after surgery.    If you take prescribed drugs: Follow your doctor s orders about which medicines to take and which to stop until after surgery.    Eating and drinking prior to surgery: follow the instructions from your surgeon  Take a shower or bath the night before surgery. Use the soap your surgeon gave you to gently clean your skin. If you do not have soap from your surgeon, use your regular soap. Do not shave or scrub the surgery site.  Wear clean pajamas and have clean sheets on your bed.   Understanding First-Degree Heart Block    Heart block is a condition in which the electrical system of the heart does not work properly. Sometimes it can result in a slow heartbeat that is either regular or irregular. This may cause symptoms. There are different types of heart block that can be more or less serious. First-degree heart block is a condition in which the wiring of the heart is slow to send electrical signals but all of the signals are able to pass successfully. There is no electrical block but rather a slowing or delay of the signal. It usually does not cause problems. Often it does not need treatment.  What causes  first-degree heart block?  First-degree heart block may be caused by:  Natural aging process  Damage to the heart from surgery  Damage to the heart muscle from a heart attack  Other types of heart disease that damage the heart muscle  Low thyroid levels  Electrolyte abnormalities  Inflammatory or infectious heart conditions  Other diseases, including rheumatic fever and sarcoidosis  Some medicines  In addition, well-conditioned athletes may develop first-degree heart block from heart changes that result from exercising a lot. This is considered normal. Some babies are born with heart block. Heart block may also run in families.  What are the symptoms of first-degree heart block?  First-degree heart block often does not have any symptoms. It may be found when your healthcare provider is examining you for some other reason.  In more severe cases, people may have an uncomfortable awareness of the heartbeat.   How is first-degree heart block treated?  First-degree heart block usually doesn t need treatment. Your healthcare provider may ask you to have regular follow-up visits. You may also be asked to take your own pulse and be alert to changes in your heart rate.  What are the  complications of first-degree heart block?  In rare instances, a first-degree heart block may develop into a more serious type of heart block that results in slower heartbeats. This may cause symptoms.  When should I call my healthcare provider?  Call your healthcare provider right away if you have any of these:  Unusual tiredness  Shortness of breath  Chest pain  Weakness, dizziness, or fainting  Unusual drowsiness or confusion  Pain that gets worse  Symptoms that don t get better with treatment, or symptoms that get worse  New symptoms   Date Last Reviewed: 5/1/2016 2000-2017 [employer]. [address] [place] [address], [place] [address]. All rights reserved. This information is not intended as a substitute for professional medical  care. Always follow your healthcare professional's instructions.                  Follow-ups after your visit        Your next 10 appointments already scheduled     Jun 18, 2018   Procedure with Jenna Akins MD   [facility] [place] [facility])    [address] [place] [id number] [phone]            Jul 30, 2018  3:00 PM CDT   [facility] with [facility] MD   [facility]    [address]  [facility] [id number] [phone]              Future tests that were ordered for you today     Open Future Orders        Priority Expected Expires Ordered    Echocardiogram Complete Routine  6/8/2019 6/8/2018            Who to contact     If you have questions or need follow up information about today's clinic visit or your schedule please contact [facility] directly at [phone].  Normal or non-critical lab and imaging results will be communicated to you by MyChart, letter or phone within 4 business days after the clinic has received the results. If you do not hear from us within 7 days, please contact the clinic through [employer] or phone. If you have a critical or abnormal lab result, we will notify you by phone as soon as possible.  Submit refill requests through [employer] or call your pharmacy and they will forward the refill request to us. Please allow 3 business days for your refill to be completed.          Additional Information About Your Visit        [employer] Information     [employer] gives you secure access to your electronic health record. If you see a primary care provider, you can also send messages to your care team and make appointments. If you have questions, please call your primary care clinic.  If you do not have a primary care provider, please call [phone] and they will assist you.        Care EveryWhere ID     This is your Care EveryWhere ID. This could be used by other organizations to access your  "[place] medical records  [id number]        Your Vitals Were     Pulse Temperature Respirations Height Pulse Oximetry BMI (Body Mass Index)    54 97.7  F (36.5  C) (Oral) 16 1.861 m (6' 1.25\") 99% 31.19 kg/m2       Blood Pressure from Last 3 Encounters:   06/08/18 120/80   06/04/18 127/78   05/24/18 120/82    Weight from Last 3 Encounters:   06/08/18 108 kg (238 lb)   06/04/18 107.5 kg (237 lb 1.6 oz)   05/24/18 107 kg (236 lb)              We Performed the Following     Basic metabolic panel     CBC with platelets     EKG 12-lead complete w/read - Clinics     Lipid panel reflex to direct LDL Fasting     PSA, screen        Primary Care Provider Office Phone # Fax #    [facility] [phone] [address] [address]        Equal Access to Services     SONJA LUIS AH: Hadii aad ku hadasho Soomaali, waaxda luqadaha, qaybta kaalmada adeegyada, cathy toro . So [facility] [phone].    ATENCIÓN: Si michelle santos, tiene a rosales disposición servicios gratuitos de asistencia lingüística. Llame al [phone].    We comply with applicable federal civil rights laws and [place] laws. We do not discriminate on the basis of race, color, national origin, age, disability, sex, sexual orientation, or gender identity.            Thank you!     Thank you for choosing [facility]  for your care. Our goal is always to provide you with excellent care. Hearing back from our patients is one way we can continue to improve our services. Please take a few minutes to complete the written survey that you may receive in the mail after your visit with us. Thank you!             Your Updated Medication List - Protect others around you: Learn how to safely use, store and throw away your medicines at www.disposemymeds.org.      Notice  As of 6/8/2018  8:13 AM    You have not been prescribed any medications.      "

## 2018-06-11 ENCOUNTER — TELEPHONE (OUTPATIENT)
Dept: FAMILY MEDICINE | Facility: CLINIC | Age: 58
End: 2018-06-11

## 2018-06-11 NOTE — TELEPHONE ENCOUNTER
Patient have questions on his abnormal Echo result.   He would like to speak to RN. Please call and discuss.     Raine Sanchez, Medical Assistant

## 2018-06-11 NOTE — TELEPHONE ENCOUNTER
Spoke with patient and he just wanted to be sure that he was understanding recommendations correctly. He wanted to make sure it is ok for him to continue to work out daily. We reviewed symptoms to watch for and to stop of slow down with exercise if having any of these symptoms. Patient will call to get scheduled with Cardiology.     Jessica Dawson RN, BSN

## 2018-06-11 NOTE — TELEPHONE ENCOUNTER
Reason for Call:  Request for results:    Name of test or procedure: Echo    Date of test of procedure: 6/9    Location of the test or procedure: [facility]    OK to leave the result message on voice mail or with a family member? YES but wants to ask some questions     Phone number Patient can be reached at:  Home number on file [phone] (home)    Additional comments: none    Call taken on 6/11/2018 at 8:56 AM by Irina Munoz

## 2018-06-13 ENCOUNTER — ANESTHESIA EVENT (OUTPATIENT)
Dept: SURGERY | Facility: AMBULATORY SURGERY CENTER | Age: 58
End: 2018-06-13

## 2018-06-18 ENCOUNTER — HOSPITAL ENCOUNTER (OUTPATIENT)
Facility: AMBULATORY SURGERY CENTER | Age: 58
Discharge: HOME | End: 2018-06-18
Attending: SURGERY | Admitting: SURGERY
Payer: COMMERCIAL

## 2018-06-18 ENCOUNTER — SURGERY (OUTPATIENT)
Age: 58
End: 2018-06-18
Payer: COMMERCIAL

## 2018-06-18 ENCOUNTER — ANESTHESIA (OUTPATIENT)
Dept: SURGERY | Facility: AMBULATORY SURGERY CENTER | Age: 58
End: 2018-06-18
Payer: COMMERCIAL

## 2018-06-18 VITALS
TEMPERATURE: 97.8 F | RESPIRATION RATE: 8 BRPM | DIASTOLIC BLOOD PRESSURE: 67 MMHG | OXYGEN SATURATION: 97 % | SYSTOLIC BLOOD PRESSURE: 123 MMHG

## 2018-06-18 DIAGNOSIS — K40.90 RIGHT INGUINAL HERNIA: Primary | ICD-10-CM

## 2018-06-18 PROCEDURE — 49650 LAP ING HERNIA REPAIR INIT: CPT | Mod: RT | Performed by: SURGERY

## 2018-06-18 PROCEDURE — G8907 PT DOC NO EVENTS ON DISCHARG: HCPCS

## 2018-06-18 PROCEDURE — 49650 LAP ING HERNIA REPAIR INIT: CPT | Mod: RT

## 2018-06-18 PROCEDURE — G8916 PT W IV AB GIVEN ON TIME: HCPCS

## 2018-06-18 DEVICE — MESH PROGRIP LAPAROSCOPIC 5.9X3.9" PARIETEX SELF-FIX LPG1510: Type: IMPLANTABLE DEVICE | Site: ABDOMEN | Status: FUNCTIONAL

## 2018-06-18 RX ORDER — CEFAZOLIN SODIUM 1 G/3ML
1 INJECTION, POWDER, FOR SOLUTION INTRAMUSCULAR; INTRAVENOUS SEE ADMIN INSTRUCTIONS
Status: DISCONTINUED | OUTPATIENT
Start: 2018-06-18 | End: 2018-06-19 | Stop reason: HOSPADM

## 2018-06-18 RX ORDER — OXYCODONE AND ACETAMINOPHEN 5; 325 MG/1; MG/1
1-2 TABLET ORAL EVERY 4 HOURS PRN
Qty: 12 TABLET | Refills: 0 | Status: SHIPPED | OUTPATIENT
Start: 2018-06-18 | End: 2018-08-27

## 2018-06-18 RX ORDER — PROPOFOL 10 MG/ML
INJECTION, EMULSION INTRAVENOUS CONTINUOUS PRN
Status: DISCONTINUED | OUTPATIENT
Start: 2018-06-18 | End: 2018-06-18

## 2018-06-18 RX ORDER — LIDOCAINE 40 MG/G
CREAM TOPICAL
Status: DISCONTINUED | OUTPATIENT
Start: 2018-06-18 | End: 2018-06-19 | Stop reason: HOSPADM

## 2018-06-18 RX ORDER — ONDANSETRON 2 MG/ML
4 INJECTION INTRAMUSCULAR; INTRAVENOUS EVERY 30 MIN PRN
Status: DISCONTINUED | OUTPATIENT
Start: 2018-06-18 | End: 2018-06-19 | Stop reason: HOSPADM

## 2018-06-18 RX ORDER — FENTANYL CITRATE 50 UG/ML
INJECTION, SOLUTION INTRAMUSCULAR; INTRAVENOUS PRN
Status: DISCONTINUED | OUTPATIENT
Start: 2018-06-18 | End: 2018-06-18

## 2018-06-18 RX ORDER — LIDOCAINE HYDROCHLORIDE 20 MG/ML
INJECTION, SOLUTION INFILTRATION; PERINEURAL PRN
Status: DISCONTINUED | OUTPATIENT
Start: 2018-06-18 | End: 2018-06-18

## 2018-06-18 RX ORDER — BUPIVACAINE HYDROCHLORIDE AND EPINEPHRINE 2.5; 5 MG/ML; UG/ML
INJECTION, SOLUTION INFILTRATION; PERINEURAL PRN
Status: DISCONTINUED | OUTPATIENT
Start: 2018-06-18 | End: 2018-06-18 | Stop reason: HOSPADM

## 2018-06-18 RX ORDER — PROPOFOL 10 MG/ML
INJECTION, EMULSION INTRAVENOUS PRN
Status: DISCONTINUED | OUTPATIENT
Start: 2018-06-18 | End: 2018-06-18

## 2018-06-18 RX ORDER — ONDANSETRON 2 MG/ML
INJECTION INTRAMUSCULAR; INTRAVENOUS PRN
Status: DISCONTINUED | OUTPATIENT
Start: 2018-06-18 | End: 2018-06-18

## 2018-06-18 RX ORDER — DEXAMETHASONE SODIUM PHOSPHATE 4 MG/ML
INJECTION, SOLUTION INTRA-ARTICULAR; INTRALESIONAL; INTRAMUSCULAR; INTRAVENOUS; SOFT TISSUE PRN
Status: DISCONTINUED | OUTPATIENT
Start: 2018-06-18 | End: 2018-06-18

## 2018-06-18 RX ORDER — GABAPENTIN 300 MG/1
300 CAPSULE ORAL ONCE
Status: COMPLETED | OUTPATIENT
Start: 2018-06-18 | End: 2018-06-18

## 2018-06-18 RX ORDER — SODIUM CHLORIDE, SODIUM LACTATE, POTASSIUM CHLORIDE, CALCIUM CHLORIDE 600; 310; 30; 20 MG/100ML; MG/100ML; MG/100ML; MG/100ML
INJECTION, SOLUTION INTRAVENOUS CONTINUOUS
Status: DISCONTINUED | OUTPATIENT
Start: 2018-06-18 | End: 2018-06-19 | Stop reason: HOSPADM

## 2018-06-18 RX ORDER — FENTANYL CITRATE 50 UG/ML
25-50 INJECTION, SOLUTION INTRAMUSCULAR; INTRAVENOUS
Status: DISCONTINUED | OUTPATIENT
Start: 2018-06-18 | End: 2018-06-19 | Stop reason: HOSPADM

## 2018-06-18 RX ORDER — MELOXICAM 15 MG/1
15 TABLET ORAL
Status: COMPLETED | OUTPATIENT
Start: 2018-06-18 | End: 2018-06-18

## 2018-06-18 RX ORDER — CEFAZOLIN SODIUM 2 G/100ML
2 INJECTION, SOLUTION INTRAVENOUS
Status: COMPLETED | OUTPATIENT
Start: 2018-06-18 | End: 2018-06-18

## 2018-06-18 RX ORDER — HYDROMORPHONE HYDROCHLORIDE 1 MG/ML
.3-.5 INJECTION, SOLUTION INTRAMUSCULAR; INTRAVENOUS; SUBCUTANEOUS EVERY 10 MIN PRN
Status: DISCONTINUED | OUTPATIENT
Start: 2018-06-18 | End: 2018-06-19 | Stop reason: HOSPADM

## 2018-06-18 RX ORDER — EPHEDRINE SULFATE 50 MG/ML
INJECTION, SOLUTION INTRAMUSCULAR; INTRAVENOUS; SUBCUTANEOUS PRN
Status: DISCONTINUED | OUTPATIENT
Start: 2018-06-18 | End: 2018-06-18

## 2018-06-18 RX ORDER — ONDANSETRON 4 MG/1
4 TABLET, ORALLY DISINTEGRATING ORAL EVERY 30 MIN PRN
Status: DISCONTINUED | OUTPATIENT
Start: 2018-06-18 | End: 2018-06-19 | Stop reason: HOSPADM

## 2018-06-18 RX ORDER — MEPERIDINE HYDROCHLORIDE 25 MG/ML
12.5 INJECTION INTRAMUSCULAR; INTRAVENOUS; SUBCUTANEOUS
Status: DISCONTINUED | OUTPATIENT
Start: 2018-06-18 | End: 2018-06-19 | Stop reason: HOSPADM

## 2018-06-18 RX ORDER — OXYCODONE HYDROCHLORIDE 5 MG/1
5 TABLET ORAL
Status: COMPLETED | OUTPATIENT
Start: 2018-06-18 | End: 2018-06-18

## 2018-06-18 RX ORDER — NALOXONE HYDROCHLORIDE 0.4 MG/ML
.1-.4 INJECTION, SOLUTION INTRAMUSCULAR; INTRAVENOUS; SUBCUTANEOUS
Status: DISCONTINUED | OUTPATIENT
Start: 2018-06-18 | End: 2018-06-19 | Stop reason: HOSPADM

## 2018-06-18 RX ORDER — ACETAMINOPHEN 325 MG/1
975 TABLET ORAL ONCE
Status: COMPLETED | OUTPATIENT
Start: 2018-06-18 | End: 2018-06-18

## 2018-06-18 RX ADMIN — Medication 20 MG: at 12:29

## 2018-06-18 RX ADMIN — DEXAMETHASONE SODIUM PHOSPHATE 10 MG: 4 INJECTION, SOLUTION INTRA-ARTICULAR; INTRALESIONAL; INTRAMUSCULAR; INTRAVENOUS; SOFT TISSUE at 12:30

## 2018-06-18 RX ADMIN — FENTANYL CITRATE 50 MCG: 50 INJECTION, SOLUTION INTRAMUSCULAR; INTRAVENOUS at 12:02

## 2018-06-18 RX ADMIN — CEFAZOLIN SODIUM 2 G: 2 INJECTION, SOLUTION INTRAVENOUS at 11:57

## 2018-06-18 RX ADMIN — FENTANYL CITRATE 25 MCG: 50 INJECTION, SOLUTION INTRAMUSCULAR; INTRAVENOUS at 13:17

## 2018-06-18 RX ADMIN — SODIUM CHLORIDE, SODIUM LACTATE, POTASSIUM CHLORIDE, CALCIUM CHLORIDE: 600; 310; 30; 20 INJECTION, SOLUTION INTRAVENOUS at 11:28

## 2018-06-18 RX ADMIN — BUPIVACAINE HYDROCHLORIDE AND EPINEPHRINE 10 ML: 2.5; 5 INJECTION, SOLUTION INFILTRATION; PERINEURAL at 12:20

## 2018-06-18 RX ADMIN — MELOXICAM 15 MG: 15 TABLET ORAL at 11:17

## 2018-06-18 RX ADMIN — PROPOFOL 200 MCG/KG/MIN: 10 INJECTION, EMULSION INTRAVENOUS at 12:04

## 2018-06-18 RX ADMIN — GABAPENTIN 300 MG: 300 CAPSULE ORAL at 11:17

## 2018-06-18 RX ADMIN — Medication 30 MG: at 12:08

## 2018-06-18 RX ADMIN — FENTANYL CITRATE 50 MCG: 50 INJECTION, SOLUTION INTRAMUSCULAR; INTRAVENOUS at 12:21

## 2018-06-18 RX ADMIN — OXYCODONE HYDROCHLORIDE 5 MG: 5 TABLET ORAL at 13:49

## 2018-06-18 RX ADMIN — LIDOCAINE HYDROCHLORIDE 40 MG: 20 INJECTION, SOLUTION INFILTRATION; PERINEURAL at 12:02

## 2018-06-18 RX ADMIN — ONDANSETRON 4 MG: 2 INJECTION INTRAMUSCULAR; INTRAVENOUS at 12:58

## 2018-06-18 RX ADMIN — EPHEDRINE SULFATE 10 MG: 50 INJECTION, SOLUTION INTRAMUSCULAR; INTRAVENOUS; SUBCUTANEOUS at 12:15

## 2018-06-18 RX ADMIN — PROPOFOL 200 MG: 10 INJECTION, EMULSION INTRAVENOUS at 12:02

## 2018-06-18 RX ADMIN — ACETAMINOPHEN 975 MG: 325 TABLET ORAL at 11:17

## 2018-06-18 RX ADMIN — PROPOFOL 100 MG: 10 INJECTION, EMULSION INTRAVENOUS at 12:29

## 2018-06-18 NOTE — H&P (VIEW-ONLY)
[facility]  [address] [address]  [phone]  Dept: [phone]    PRE-OP EVALUATION:  Today's date: 2018    Bang Donis (: 1960) presents for pre-operative evaluation assessment as requested by Dr. Akins.  He requires evaluation and anesthesia risk assessment prior to undergoing surgery/procedure for treatment of hernia.    Fax number for surgical facility: [facility]  Primary Physician: [facility]  Type of Anesthesia Anticipated: General    Patient has a Health Care Directive or Living Will:  NO    Preop Questions 2018   Who is doing your surgery? Jenna Akins   What are you having done? Hernia Operation   Facility or Hospital where procedure/surgery will be performed: [facility]   1.  Do you have a history of Heart attack, stroke, stent, coronary bypass surgery, or other heart surgery? No   2.  Do you ever have any pain or discomfort in your chest? No   3.  Do you have a history of  Heart Failure? No   4.   Are you troubled by shortness of breath when:  walking on a level surface, or up a slight hill, or at night? No   5.  Do you currently have a cold, bronchitis or other respiratory infection? No   6.  Do you have a cough, shortness of breath, or wheezing? No   7.  Do you sometimes get pains in the calves of your legs when you walk? No   8. Do you or anyone in your family have previous history of blood clots? No   9.  Do you or does anyone in your family have a serious bleeding problem such as prolonged bleeding following surgeries or cuts? No   10. Have you ever had problems with anemia or been told to take iron pills? No   11. Have you had any abnormal blood loss such as black, tarry or bloody stools? No   12. Have you ever had a blood transfusion? No   13. Have you or any of your relatives ever had problems with anesthesia? No   14. Do you have sleep apnea, excessive snoring or daytime  drowsiness? No   15. Do you have any prosthetic heart valves? No   16. Do you have prosthetic joints? No         HPI:     HPI related to upcoming procedure: right groin pain, worsens with activity.     Not good sleeper. Always has been.  Urinating more at night.   Rides a bike a lot, jogs at times, elliptical at the gym.       MEDICAL HISTORY:     Patient Active Problem List    Diagnosis Date Noted     First degree AV block 06/08/2018     Priority: Medium     Right groin hernia 06/08/2018     Priority: Medium     Urinary frequency 06/08/2018     Priority: Medium     Obesity (BMI 30-39.9) 05/24/2018     Priority: Medium     CARDIOVASCULAR SCREENING; LDL GOAL LESS THAN 160 01/31/2011     Priority: Medium      History reviewed. No pertinent past medical history.  Past Surgical History:   Procedure Laterality Date     COLONOSCOPY  7/9/2012    Procedure: COLONOSCOPY;  Colonoscopy ;  Surgeon: Homar Leger MD;  Location: [facility] OR     No current outpatient prescriptions on file.     OTC products: None, except as noted above    No Known Allergies   Latex Allergy: NO    Social History   Substance Use Topics     Smoking status: Never Smoker     Smokeless tobacco: Never Used     Alcohol use Yes      Comment: occasional     History   Drug Use No       REVIEW OF SYSTEMS:   CONSTITUTIONAL: NEGATIVE for fever, chills, change in weight  INTEGUMENTARY/SKIN: NEGATIVE for worrisome rashes, moles or lesions  EYES: NEGATIVE for vision changes or irritation  ENT/MOUTH: NEGATIVE for ear, mouth and throat problems  RESP: NEGATIVE for significant cough or SOB  BREAST: NEGATIVE for masses, tenderness or discharge  CV: NEGATIVE for chest pain, palpitations or peripheral edema  GI: NEGATIVE for nausea, abdominal pain, heartburn, or change in bowel habits  : NEGATIVE for frequency, dysuria, or hematuria  MUSCULOSKELETAL: NEGATIVE for significant arthralgias or myalgia  NEURO: NEGATIVE for weakness, dizziness or paresthesias  ENDOCRINE:  "NEGATIVE for temperature intolerance, skin/hair changes  HEME: NEGATIVE for bleeding problems  PSYCHIATRIC: NEGATIVE for changes in mood or affect    EXAM:   /80 (BP Location: Right arm, Patient Position: Sitting, Cuff Size: Adult Regular)  Pulse 54  Temp 97.7  F (36.5  C) (Oral)  Resp 16  Ht 1.861 m (6' 1.25\")  Wt 108 kg (238 lb)  SpO2 99%  BMI 31.19 kg/m2    GENERAL APPEARANCE: healthy, alert and no distress     EYES: EOMI,  PERRL     HENT: ear canals and TM's normal and nose and mouth without ulcers or lesions     NECK: no adenopathy, no asymmetry, masses, or scars and thyroid normal to palpation     RESP: lungs clear to auscultation - no rales, rhonchi or wheezes     CV: regular rates and rhythm, normal S1 S2, no S3 or S4 and no murmur, click or rub     ABDOMEN:  soft, nontender, no HSM or masses and bowel sounds normal     MS: extremities normal- no gross deformities noted, no evidence of inflammation in joints, FROM in all extremities. No groin pain today     SKIN: no suspicious lesions or rashes     NEURO: Normal strength and tone, sensory exam grossly normal, mentation intact and speech normal     PSYCH: mentation appears normal. and affect normal/bright     LYMPHATICS: No cervical adenopathy    DIAGNOSTICS:     EKG: sinus bradycardia, first degree AVB, slight widened QRS  Labs Resulted Today:   Results for orders placed or performed in visit on 06/08/18   CBC with platelets   Result Value Ref Range    WBC 6.0 4.0 - 11.0 10e9/L    RBC Count 4.53 4.4 - 5.9 10e12/L    Hemoglobin 14.3 13.3 - 17.7 g/dL    Hematocrit 41.4 40.0 - 53.0 %    MCV 91 78 - 100 fl    MCH 31.6 26.5 - 33.0 pg    MCHC 34.5 31.5 - 36.5 g/dL    RDW 12.9 10.0 - 15.0 %    Platelet Count 189 150 - 450 10e9/L   EKG 12-lead complete w/read - Clinics    Impression    SB with 1st Deg AVB, slight QRS widening, non-specific. No previous EKG to compare to.  Jocelyn Greene, APRN, NP-C         No results for input(s): HGB, PLT, INR, NA, " POTASSIUM, CR, A1C in the last 69181 hours.     IMPRESSION:   Reason for surgery/procedure: groin pain  Diagnosis/reason for consult: right groin hernia    The proposed surgical procedure is considered LOW risk.    REVISED CARDIAC RISK INDEX  The patient has the following serious cardiovascular risks for perioperative complications such as (MI, PE, VFib and 3  AV Block): He has new first degree AVB  No serious cardiac risks  INTERPRETATION: 1 risks: Class II (low risk - 0.9% complication rate)    The patient has the following additional risks for perioperative complications:  No identified additional risks      ICD-10-CM    1. Preop general physical exam Z01.818 EKG 12-lead complete w/read - Clinics     CBC with platelets     Basic metabolic panel   2. Right groin hernia K40.90    3. First degree AV block I44.0 Echocardiogram Complete     TSH with free T4 reflex   4. Urinary frequency R35.0 PSA, screen   5. Screening for cholesterol level Z13.220 Lipid panel reflex to direct LDL Fasting       RECOMMENDATIONS:       Cardiovascular Risk  New first degree AVB, will get ECHO prior to surgery, if normal okay to continue surgery, if not will need cardiology consult prior to surgery      --Patient is to take all scheduled medications on the day of surgery EXCEPT for modifications listed below. none    APPROVAL GIVEN to proceed with proposed procedure,   given ECHO is normal         Signed Electronically by: SUDHEER Manriquez, NP-C      Copy of this evaluation report is provided to requesting physician.    [place] Preop Guidelines    Revised Cardiac Risk Index

## 2018-06-18 NOTE — IP AVS SNAPSHOT
MRN:[id number]                      After Visit Summary   6/18/2018    Bang Donis    MRN: [id number]           Thank you!     Thank you for choosing [facility] for your care. Our goal is always to provide you with excellent care. Hearing back from our patients is one way we can continue to improve our services. Please take a few minutes to complete the written survey that you may receive in the mail after you visit with us. Thank you!        Patient Information     Date Of Birth          1960        About your hospital stay     You were admitted on:  June 18, 2018 You last received care in the:  [facility]    You were discharged on:  June 18, 2018       Who to Call     For medical emergencies, please call 911.  For non-urgent questions about your medical care, please call your primary care provider or clinic, [phone]  For questions related to your surgery, please call your surgery clinic        Attending Provider     Provider Specialty    Jenna Akins MD Surgery       Primary Care Provider Office Phone # Fax #    [facility] [phone]      After Care Instructions     Diet Instructions       Resume pre-procedure diet            Discharge Instructions       Patient to follow up with appointment in 4 weeks            Do not - immerse incision in water until sutures removed       Do not immerse incision in water for 1 week            Dressing       Remove dressing in 2 days, remove steristrips as they roll off ~ 2 weeks.            Ice to affected area       Ice to operative site PRN            No Alcohol       For 24 hours post procedure            No driving or operating machinery        until the day after procedure            Shower       May shower            Weight bearing status - As tolerated                 Your next 10 appointments already scheduled     Jul 12, 2018  8:00 AM CDT   New Visit with Bacilio Lucas MD   [facility]  [facility]    [address] [facility] [address]   [phone]            2018  3:00 PM CDT   [facility]kins [facility]   [facility]    [address] [facility] [address]   [phone]              Further instructions from your care team       [facility]  Same-Day Surgery   Adult Discharge Orders & Instructions   For 24 hours after surgery  1. Get plenty of rest.  A responsible adult must stay with you for at least 24 hours after you leave the hospital.   2. Do not drive or use heavy equipment.  If you have weakness or tingling, don't drive or use heavy equipment until this feeling goes away.  3. Do not drink alcohol.  4. Avoid strenuous or risky activities.  Ask for help when climbing stairs.   5. You may feel lightheaded.  IF so, sit for a few minutes before standing.  Have someone help you get up.   6. If you have nausea (feel sick to your stomach): Drink only clear liquids such as apple juice, ginger ale, broth or 7-Up.  Rest may also help.  Be sure to drink enough fluids.  Move to a regular diet as you feel able.  7. You may have a slight fever. Call the doctor if your fever is over 100 F (37.7 C) (taken under the tongue) or lasts longer than 24 hours.  8. You may have a dry mouth, a sore throat, muscle aches or trouble sleeping.  These should go away after 24 hours.  9. Do not make important or legal decisions.   Call your doctor for any of the followin.  Signs of infection (fever, growing tenderness at the surgery site, a large amount of drainage or bleeding, severe pain, foul-smelling drainage, redness, swelling).    2. It has been over 8 to 10 hours since surgery and you are still not able to urinate (pass water).    3.  Headache for over 24 hours.        Pending Results     No orders found from 2018 to 2018.            Admission  Information     Date & Time Provider Department Dept. Phone    6/18/2018 Jenna Akins MD [facility] [phone]      Your Vitals Were     Blood Pressure Temperature Respirations Pulse Oximetry          119/62 97.9  F (36.6  C) (Temporal) 14 100%        MyChart Information     [facility] gives you secure access to your electronic health record. If you see a primary care provider, you can also send messages to your care team and make appointments. If you have questions, please call your primary care clinic.  If you do not have a primary care provider, please call [phone] and they will assist you.        Care EveryWhere ID     This is your Care EveryWhere ID. This could be used by other organizations to access your [facility] medical records  [id number]        Equal Access to Services     SONJA LUIS : Samuel Smiley, maliha means, brian ramsey, cathy chnog. So [facility] [phone].    ATENCIÓN: Si habla español, tiene a rosales disposición servicios gratuitos de asistencia lingüística. Llame al [phone].    We comply with applicable federal civil rights laws and [place] laws. We do not discriminate on the basis of race, color, national origin, age, disability, sex, sexual orientation, or gender identity.               Review of your medicines      START taking        Dose / Directions    oxyCODONE-acetaminophen 5-325 MG per tablet   Commonly known as:  PERCOCET   Used for:  Right inguinal hernia        Dose:  1-2 tablet   Take 1-2 tablets by mouth every 4 hours as needed for pain (moderate to severe)   Quantity:  12 tablet   Refills:  0            Where to get your medicines      Some of these will need a paper prescription and others can be bought over the counter. Ask your nurse if you have questions.     Bring a paper prescription for each of these medications     oxyCODONE-acetaminophen 5-325 MG per tablet                 Protect others around you: Learn how to safely use, store and throw away your medicines at www.disposemymeds.org.        Information about OPIOIDS     PRESCRIPTION OPIOIDS: WHAT YOU NEED TO KNOW   We gave you an opioid (narcotic) pain medicine. It is important to manage your pain, but opioids are not always the best choice. You should first try all the other options your care team gave you. Take this medicine for as short a time (and as few doses) as possible.     These medicines have risks:    DO NOT drive when on new or higher doses of pain medicine. These medicines can affect your alertness and reaction times, and you could be arrested for driving under the influence (DUI). If you need to use opioids long-term, talk to your care team about driving.    DO NOT operate heave machinery    DO NOT do any other dangerous activities while taking these medicines.     DO NOT drink any alcohol while taking these medicines.      If the opioid prescribed includes acetaminophen, DO NOT take with any other medicines that contain acetaminophen. Read all labels carefully. Look for the word  acetaminophen  or  Tylenol.  Ask your pharmacist if you have questions or are unsure.    You can get addicted to pain medicines, especially if you have a history of addiction (chemical, alcohol or substance dependence). Talk to your care team about ways to reduce this risk.    Store your pills in a secure place, locked if possible. We will not replace any lost or stolen medicine. If you don t finish your medicine, please throw away (dispose) as directed by your pharmacist. The [place] Pollution Control Agency has more information about safe disposal: https://www.pca.state.[place]/living-green/managing-unwanted-medications.     All opioids tend to cause constipation. Drink plenty of water and eat foods that have a lot of fiber, such as fruits, vegetables, prune juice, apple juice and high-fiber cereal. Take a laxative (Miralax, milk of  magnesia, Colace, Senna) if you don t move your bowels at least every other day.              Medication List: This is a list of all your medications and when to take them. Check marks below indicate your daily home schedule. Keep this list as a reference.      Medications           Morning Afternoon Evening Bedtime As Needed    oxyCODONE-acetaminophen 5-325 MG per tablet   Commonly known as:  PERCOCET   Take 1-2 tablets by mouth every 4 hours as needed for pain (moderate to severe)

## 2018-06-18 NOTE — ANESTHESIA CARE TRANSFER NOTE
Patient: Bang Donis    Procedure(s):  Laparoscopic right inguinal hernia repair with mesh - Wound Class: I-Clean    Diagnosis: Right symptomatic inguinal hernia  Diagnosis Additional Information: No value filed.    Anesthesia Type:   General, LMA     Note:  Airway :Nasal Cannula  Patient transferred to:PACU  Comments: To PACU after suctioned and extubated awake.  Report to RN. VSS, Respiratory status stable.Handoff Report: Identifed the Patient, Identified the Reponsible Provider, Reviewed the pertinent medical history, Discussed the surgical course, Reviewed Intra-OP anesthesia mangement and issues during anesthesia, Set expectations for post-procedure period and Allowed opportunity for questions and acknowledgement of understanding      Vitals: (Last set prior to Anesthesia Care Transfer)    CRNA VITALS  6/18/2018 1242 - 6/18/2018 1315      6/18/2018             Pulse: 60    SpO2: 100 %    Resp Rate (observed): 20                Electronically Signed By: SUDHEER King CRNA  June 18, 2018  1:15 PM

## 2018-06-18 NOTE — OP NOTE
Preoperative Diagnosis: Right inguinal hernia    Postoperative Diagnosis: Right inguinal hernia     Procedure: Laparoscopic right inguinal hernia repair    Attending Surgeon: Jenna Akins MD    Anesthesiologist: Anil Laguna MD    Preoperative Note: This is a 58 year old male with a symptomatic right inguinal hernia. At time of surgery, a right indirect inguinal hernia was identified.    Operative Note:   Once the patient was induced under general anesthesia and intubated, the abdomen was prepped and draped in the usual fashion. A time out was performed. All trocar site were pre anesthetized with marcaine with epinephrine. An infraumbilial incision was made and the anterior rectus sheath was opened off midline. The retrorectus space was entered and the balloon dissection was performed under direct visualization. The balloon dissector was replaced with the balloon tip trocar, and CO2 insufflation was performed up to a pressure of 12. Under direct visualization two 5 mm port were placed in the lower midline. Migue's ligament was identified and dissection was begun in the right groin. The lateral space was opened up, and the indirect  sac was identified and stripped down off the cord structures. A lipoma of the cord was noted and was reduced. Minimal dissection of the left groin failed to identify a contralateral hernia. A piece of Parietex Progrip mesh 10x15 cm was placed in the preperitoneal space and unrolled to cover the myopectineal orifice. Examination of the operative field identified good hemostasis. The ports were then removed under direct visualization. The anterior rectus sheath at the umbilical incision was closed with 0 vicryl. Skin was closed with 4-0 vicryl subcuticular. Steristrips were applied and a dry dressing. The patient was lightened from anesthesia, extubated and taken to recover room in stable condition. Sponge, instrument and needle counts were correct at the end of the case. Estimated  blood loss was minimal.

## 2018-06-18 NOTE — ANESTHESIA POSTPROCEDURE EVALUATION
Patient: Bang Donis    Procedure(s):  Laparoscopic right inguinal hernia repair with mesh - Wound Class: I-Clean    Diagnosis:Right symptomatic inguinal hernia  Diagnosis Additional Information: No value filed.    Anesthesia Type:  General, LMA    Note:  Anesthesia Post Evaluation    Patient location during evaluation: PACU  Patient participation: Able to fully participate in evaluation  Level of consciousness: awake  Pain management: adequate  Airway patency: patent  Cardiovascular status: acceptable  Respiratory status: acceptable  Hydration status: balanced  PONV: none     Anesthetic complications: None          Last vitals:  Vitals:    06/18/18 1330 06/18/18 1345 06/18/18 1400   BP: 117/68 124/64 123/67   Resp: 13 12 8   Temp:   36.6  C (97.8  F)   SpO2: 99% 98% 97%         Electronically Signed By: Anil Laguna MD  June 18, 2018  3:05 PM

## 2018-06-18 NOTE — INTERVAL H&P NOTE
Preop Dx: right inguinal hernia  Planned procedure: laparoscopic right inguinal hernia repair, possible bilateral  Patient seen and examined. No changes to previously documented H&P

## 2018-06-18 NOTE — ANESTHESIA PREPROCEDURE EVALUATION
Anesthesia Evaluation     .             ROS/MED HX    ENT/Pulmonary:  - neg pulmonary ROS     Neurologic:  - neg neurologic ROS     Cardiovascular:  - neg cardiovascular ROS       METS/Exercise Tolerance:     Hematologic:  - neg hematologic  ROS       Musculoskeletal:  - neg musculoskeletal ROS       GI/Hepatic:  - neg GI/hepatic ROS       Renal/Genitourinary:  - ROS Renal section negative       Endo:  - neg endo ROS       Psychiatric:  - neg psychiatric ROS       Infectious Disease:  - neg infectious disease ROS       Malignancy:      - no malignancy   Other:    - neg other ROS                 Physical Exam  Normal systems: cardiovascular, pulmonary and dental    Airway   Mallampati: II  TM distance: >3 FB  Neck ROM: full    Dental     Cardiovascular       Pulmonary                     Anesthesia Plan      History & Physical Review  History and physical reviewed and following examination; no interval change.    ASA Status:  1 .    NPO Status:  > 8 hours    Plan for General and LMA with Intravenous induction. Maintenance will be Balanced.    PONV prophylaxis:  Ondansetron (or other 5HT-3) and Dexamethasone or Solumedrol       Postoperative Care  Postoperative pain management:  IV analgesics, Oral pain medications and Multi-modal analgesia.      Consents  Anesthetic plan, risks, benefits and alternatives discussed with:  Patient..                          .

## 2018-06-18 NOTE — DISCHARGE INSTRUCTIONS
[facility]  Same-Day Surgery   Adult Discharge Orders & Instructions   For 24 hours after surgery  1. Get plenty of rest.  A responsible adult must stay with you for at least 24 hours after you leave the hospital.   2. Do not drive or use heavy equipment.  If you have weakness or tingling, don't drive or use heavy equipment until this feeling goes away.  3. Do not drink alcohol.  4. Avoid strenuous or risky activities.  Ask for help when climbing stairs.   5. You may feel lightheaded.  IF so, sit for a few minutes before standing.  Have someone help you get up.   6. If you have nausea (feel sick to your stomach): Drink only clear liquids such as apple juice, ginger ale, broth or 7-Up.  Rest may also help.  Be sure to drink enough fluids.  Move to a regular diet as you feel able.  7. You may have a slight fever. Call the doctor if your fever is over 100 F (37.7 C) (taken under the tongue) or lasts longer than 24 hours.  8. You may have a dry mouth, a sore throat, muscle aches or trouble sleeping.  These should go away after 24 hours.  9. Do not make important or legal decisions.   Call your doctor for any of the followin.  Signs of infection (fever, growing tenderness at the surgery site, a large amount of drainage or bleeding, severe pain, foul-smelling drainage, redness, swelling).    2. It has been over 8 to 10 hours since surgery and you are still not able to urinate (pass water).    3.  Headache for over 24 hours.

## 2018-06-18 NOTE — IP AVS SNAPSHOT
[facility]    [address] [place] [address]    Phone:  [phone]                                       After Visit Summary   6/18/2018    Bang Donis    MRN: [id number]           After Visit Summary Signature Page     I have received my discharge instructions, and my questions have been answered. I have discussed any challenges I see with this plan with the nurse or doctor.    ..........................................................................................................................................  Patient/Patient Representative Signature      ..........................................................................................................................................  Patient Representative Print Name and Relationship to Patient    ..................................................               ................................................  Date                                            Time    ..........................................................................................................................................  Reviewed by Signature/Title    ...................................................              ..............................................  Date                                                            Time

## 2018-07-05 ENCOUNTER — PRE VISIT (OUTPATIENT)
Dept: LAB | Facility: CLINIC | Age: 58
End: 2018-07-05

## 2018-07-05 RX ORDER — CIDER VINEGAR 300 MG
1000 TABLET ORAL
COMMUNITY
End: 2024-02-21

## 2018-07-05 NOTE — TELEPHONE ENCOUNTER
PREVISIT INFORMATION                                                    Bang Donis scheduled for future visit at [facility] specialty clinics.    Patient is scheduled to see [facility] on 7/12  Reason for visit: First degree AVB  Referring provider Jocelyn Greene  Has patient seen previous specialist? No  Medical Records:  Available in chart.  Patient was previously seen at a [facility] or [facility] facility.    REVIEW                                                      New patient packet mailed to patient: No  Medication reconciliation complete: Yes      Current Outpatient Prescriptions   Medication Sig Dispense Refill     Omega-3 Fatty Acids (FISH OIL) 1000 MG CPDR Take 1,000 mg by mouth       oxyCODONE-acetaminophen (PERCOCET) 5-325 MG per tablet Take 1-2 tablets by mouth every 4 hours as needed for pain (moderate to severe) (Patient not taking: Reported on 7/5/2018) 12 tablet 0       Allergies: Review of patient's allergies indicates no known allergies.        PLAN/FOLLOW-UP NEEDED                                                      Previsit review complete.  Patient will see provider at future scheduled appointment.     Patient Reminders Given:     Clinic location reviewed.  If you need to cancel or reschedule,please call [phone].    Mima Olguin

## 2018-07-12 ENCOUNTER — OFFICE VISIT (OUTPATIENT)
Dept: CARDIOLOGY | Facility: CLINIC | Age: 58
End: 2018-07-12
Attending: NURSE PRACTITIONER
Payer: COMMERCIAL

## 2018-07-12 VITALS
HEART RATE: 52 BPM | SYSTOLIC BLOOD PRESSURE: 136 MMHG | BODY MASS INDEX: 31.12 KG/M2 | OXYGEN SATURATION: 97 % | WEIGHT: 237.5 LBS | DIASTOLIC BLOOD PRESSURE: 78 MMHG

## 2018-07-12 DIAGNOSIS — I77.89 ENLARGED AORTA (H): Primary | ICD-10-CM

## 2018-07-12 PROCEDURE — 99214 OFFICE O/P EST MOD 30 MIN: CPT | Mod: 24 | Performed by: INTERNAL MEDICINE

## 2018-07-12 ASSESSMENT — PAIN SCALES - GENERAL: PAINLEVEL: NO PAIN (0)

## 2018-07-12 NOTE — PATIENT INSTRUCTIONS
The following is a summary of your office visit:    Medications started today:none    Medications stopped today:none    Medication dose change: none    Nurse contact information: Mima Olguin RN  Cardiology Care Coordinator  [phone] Phone  [phone] Fax    Appointments made today: echocardiogram and 6 month follow up    Patient instructions: none      If you have had any blood work, imaging or other testing completed we will be in touch within 1-2 weeks regarding the results. If you have any questions, concerns or need to schedule a follow up, please contact us at [phone]. If you are needing refills please contact your pharmacy. For urgent after hour care please call the [facility] at [phone] or the [facility] at [phone] and ask to speak to the cardiologist on call.    It was a pleasure meeting with you today. Please let us know if there is anything else we can do for you so that we can be sure you are leaving completely satisfied with your care experience.     Your Cardiology Team at [facility]  RN Care Coordinator: Mima SEPULVEDA: Treasure

## 2018-07-12 NOTE — PROGRESS NOTES
Jocelyn Greene NP   [address] [address]      2018      Dear Jocelyn Greene:      It was a pleasure participating in the care of your patient, Mr. Bang Donis.  As you know, he is a 58-year-old gentleman who I see today for an abnormal echocardiogram.      His past medical history is significant for the followin.  Hyperlipidemia.   2.  Hernia surgery.   3.  Obesity.      CARDIAC HISTORY:  Denies having any history of cardiac disease.      In terms of his present symptom complex, apparently, an echocardiogram was performed that was found to be abnormal.  He was referred here for further evaluation.      From a clinical standpoint, he is doing well.  He performs frequent cardio workouts on the elliptical machine and goes running.  In fact, he went for 25 mile bike ride yesterday without symptoms.  He denies having any chest pain, problems breathing, PND, orthopnea, edema, palpitations, syncope or near-syncope.      In terms of his cardiac risk factors, no history of diabetes or hypertension.  He does not smoke or drink.  Denies family history of heart disease.  He does have mild hyperlipidemia.  He is employed as a .      CURRENT MEDICATIONS:  Include fish oil.      PHYSICAL EXAMINATION:     VITAL SIGNS:  His blood pressure is 136/78 with a pulse of 52.  His weight is 237 pounds.   NECK:  Exam reveals a normal jugular venous pressure, no carotid bruits.   LUNGS:  Clear to auscultation.  Respiratory effort is normal.   CARDIAC:  Reveals a regular rate and rhythm, no obvious murmur or gallop appreciated.   ABDOMEN:  Belly soft, nontender.   EXTREMITIES:  Without gross edema.      Echocardiogram 2018 reveals an ejection fraction of 60% -65%, reported moderate central MR.  Aorta is dilated, ascending aorta dilated at 4.1 cm, LV end-systolic dimension 3.5.  2018 potassium 4.3.  GFR, hemoglobin and TSH normal.         IMPRESSION:      Vy is a 58-year-old gentleman without a prior documented history of cardiac disease who presents with 2 active cardiac issues:     1.  Dilated ascending aorta, 4.1 cm.      This is a new finding for him.  There are no old studies for comparison.  It is unclear if he has a diagnosis of hypertension or not.  Gathering further information and serial followup would be indicated.     2.  Mitral insufficiency.  Reportedly, moderate in degree.      After reviewing the images, the degree of mitral insufficiency is likely mild.  However, the patient is completely asymptomatic.  Will continue to follow on a clinical basis.        PLAN:     1.  With a new finding of an enlarged ascending aorta, we will recheck another echocardiogram in 6 months.  If his aortic dimension is stable at that point in time, then we will follow him noninvasively on an annual basis.     2.  A blood pressure kit was prescribed, and he will keep track of his blood pressures at home to ensure that his blood pressures are in the goal range of approximately 120 mmHg or less, such that we can halt progression of aortic dilatation the future as much as possible.     3.  Follow up with an echo in 6 months or earlier if needed.      Once again, it was a pleasure participating in the care of your patient, Mr. Vy Donis.  Please feel free to contact me anytime if you have any questions regarding his care in the future.         Sincerely,      SEVERO SMITH MD             D: 2018   T: 2018   MT: OSVALDO      Name:     VY DONIS   MRN:      [id number]        Account:      [id number]   :      1960      Document: [id number]       cc: Jocelyn Greene NP

## 2018-07-12 NOTE — MR AVS SNAPSHOT
After Visit Summary   7/12/2018    Bang Donis    MRN: [id number]           Patient Information     Date Of Birth          1960        Visit Information        Provider Department      7/12/2018 8:00 AM Bacilio Lucas MD [facility]        Today's Diagnoses     Enlarged aorta (H)    -  1      Care Instructions      The following is a summary of your office visit:    Medications started today:none    Medications stopped today:none    Medication dose change: none    Nurse contact information: Mima Olguin RN  Cardiology Care Coordinator  [phone] Phone  [phone] Fax    Appointments made today: echocardiogram and 6 month follow up    Patient instructions: none      If you have had any blood work, imaging or other testing completed we will be in touch within 1-2 weeks regarding the results. If you have any questions, concerns or need to schedule a follow up, please contact us at [phone]. If you are needing refills please contact your pharmacy. For urgent after hour care please call the [facility] at [phone] or the [facility] at [phone] and ask to speak to the cardiologist on call.    It was a pleasure meeting with you today. Please let us know if there is anything else we can do for you so that we can be sure you are leaving completely satisfied with your care experience.     Your Cardiology Team at [facility]  RN Care Coordinator: Mima Amanda                    Follow-ups after your visit        Your next 10 appointments already scheduled     Jul 30, 2018  3:00 PM CDT   Return Visit with Jenna Akins MD   [facility])    [address] [place] [id number] [phone]            Jan 07, 2019  8:30 AM CST   Ech Complete with MGWANG MG ECHO TECH   [facility])    [address] [place] [address]  [facility] [address] [phone]           1.  Please bring or wear a comfortable two-piece outfit. 2.  You may eat, drink and take your normal medicines. 3.  For any questions that cannot be answered, please contact the ordering physician 4.  Please do not wear perfumes or scented lotions on the day of your exam.            Flako 10, 2019  8:00 AM CST   Return Visit with Bacilio Lucas MD   [email or web address] ([email or web address]    [address] [address] [place] [address] [phone]              Future tests that were ordered for you today     Open Future Orders        Priority Expected Expires Ordered    Echocardiogram Routine 1/14/2019 7/12/2019 7/12/2018            Who to contact     If you have questions or need follow up information about today's clinic visit or your schedule please contact [facility] directly at [phone].  Normal or non-critical lab and imaging results will be communicated to you by streamOncehart, letter or phone within 4 business days after the clinic has received the results. If you do not hear from us within 7 days, please contact the clinic through Selatrat or phone. If you have a critical or abnormal lab result, we will notify you by phone as soon as possible.  Submit refill requests through LIFEMODELER or call your pharmacy and they will forward the refill request to us. Please allow 3 business days for your refill to be completed.          Additional Information About Your Visit        streamOncehart Information     LIFEMODELER gives you secure access to your electronic health record. If you see a primary care provider, you can also send messages to your care team and make appointments. If you have questions, please call your primary care clinic.  If you do not have a primary care provider, please call [phone] and they will assist you.      LIFEMODELER is an electronic gateway that provides easy, online access to your medical records.  With [employer], you can request a clinic appointment, read your test results, renew a prescription or communicate with your care team.     To access your existing account, please contact your [facility] or call [phone] for assistance.        Care EveryWhere ID     This is your Care EveryWhere ID. This could be used by other organizations to access your [facility] medical records  [id number]        Your Vitals Were     Pulse Pulse Oximetry BMI (Body Mass Index)             52 97% 31.12 kg/m2          Blood Pressure from Last 3 Encounters:   07/12/18 136/78   06/18/18 123/67   06/08/18 120/80    Weight from Last 3 Encounters:   07/12/18 107.7 kg (237 lb 8 oz)   06/08/18 108 kg (238 lb)   06/04/18 107.5 kg (237 lb 1.6 oz)                 Today's Medication Changes          These changes are accurate as of 7/12/18  8:47 AM.  If you have any questions, ask your nurse or doctor.               Start taking these medicines.        Dose/Directions    Blood Pressure Monitor Kit   Used for:  Enlarged aorta (H)   Started by:  Bacilio Lucas MD        Dose:  1 Device   1 Device daily   Quantity:  1 kit   Refills:  1            Where to get your medicines      Some of these will need a paper prescription and others can be bought over the counter.  Ask your nurse if you have questions.     Bring a paper prescription for each of these medications     Blood Pressure Monitor Kit                Primary Care Provider Office Phone # Fax #    [facility] [phone] [address] [address]        Equal Access to Services     GÓMEZ LUIS AH: Hadii michelle ku hadasho Sosteffiali, waaxda luqadaha, qaybta kaalmada adeegyada, cathy chong. So [facility] [phone].    ATENCIÓN: Si habla español, tiene a rosales disposición servicios gratuitos de asistencia lingüística. Llame al [phone].    We comply with applicable federal civil rights  laws and [place] laws. We do not discriminate on the basis of race, color, national origin, age, disability, sex, sexual orientation, or gender identity.            Thank you!     Thank you for choosing [facility]  for your care. Our goal is always to provide you with excellent care. Hearing back from our patients is one way we can continue to improve our services. Please take a few minutes to complete the written survey that you may receive in the mail after your visit with us. Thank you!             Your Updated Medication List - Protect others around you: Learn how to safely use, store and throw away your medicines at www.disposemymeds.org.          This list is accurate as of 7/12/18  8:47 AM.  Always use your most recent med list.                   Brand Name Dispense Instructions for use Diagnosis    Blood Pressure Monitor Kit     1 kit    1 Device daily    Enlarged aorta (H)       Fish Oil 1000 MG Cpdr      Take 1,000 mg by mouth        oxyCODONE-acetaminophen 5-325 MG per tablet    PERCOCET    12 tablet    Take 1-2 tablets by mouth every 4 hours as needed for pain (moderate to severe)    Right inguinal hernia

## 2018-07-12 NOTE — NURSING NOTE
Bang Donis's goals for this visit include:   Chief Complaint   Patient presents with     Consult     Mitral valve insufficiency       He requests these members of his care team be copied on today's visit information: PCP    PCP: [facility]    Referring Provider:  Jocelyn Greene NP  [address] [place] [address]    /78 (BP Location: Left arm, Patient Position: Chair, Cuff Size: Adult Large)  Pulse 52  Wt 107.7 kg (237 lb 8 oz)  SpO2 97%  BMI 31.12 kg/m2    Do you need any medication refills at today's visit? None      Treasure Loyd, [facility]

## 2018-08-27 ENCOUNTER — OFFICE VISIT (OUTPATIENT)
Dept: FAMILY MEDICINE | Facility: CLINIC | Age: 58
End: 2018-08-27
Payer: COMMERCIAL

## 2018-08-27 VITALS
WEIGHT: 241 LBS | DIASTOLIC BLOOD PRESSURE: 80 MMHG | TEMPERATURE: 97.8 F | BODY MASS INDEX: 31.94 KG/M2 | HEART RATE: 59 BPM | HEIGHT: 73 IN | OXYGEN SATURATION: 99 % | SYSTOLIC BLOOD PRESSURE: 122 MMHG | RESPIRATION RATE: 18 BRPM

## 2018-08-27 DIAGNOSIS — S80.262A INSECT BITE OF LEFT KNEE, INITIAL ENCOUNTER: Primary | ICD-10-CM
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
## 2018-08-27 PROCEDURE — 99213 OFFICE O/P EST LOW 20 MIN: CPT | Performed by: NURSE PRACTITIONER

## 2018-08-27 RX ORDER — CEPHALEXIN 500 MG/1
500 CAPSULE ORAL 3 TIMES DAILY
Qty: 21 CAPSULE | Refills: 0 | Status: SHIPPED | OUTPATIENT
Start: 2018-08-27 | End: 2018-09-03

## 2018-08-27 ASSESSMENT — PAIN SCALES - GENERAL: PAINLEVEL: MILD PAIN (3)

## 2018-08-27 NOTE — MR AVS SNAPSHOT
After Visit Summary   8/27/2018    Bang Donis    MRN: [id number]           Patient Information     Date Of Birth          1960        Visit Information        Provider Department      8/27/2018 7:20 AM Jocelyn Greene NP [facility]        Today's Diagnoses     Insect bite of left knee, initial encounter    -  1      Care Instructions    Call or return in 2 days if things are not improved.  Could trial stronger antibiotic          Follow-ups after your visit        Your next 10 appointments already scheduled     Jan 07, 2019  8:30 AM CST   E[facility] with MGWANG MG [facility]CHO TECH   [facility]    [address] [place] [address] [phone]           1.  Please bring or wear a comfortable two-piece outfit. 2.  You may eat, drink and take your normal medicines. 3.  For any questions that cannot be answered, please contact the ordering physician 4.  Please do not wear perfumes or scented lotions on the day of your exam.            Flako 10, 2019  8:00 AM CST   [facility]   [facility]    [address] [address] [place] [address] [phone]              Who to contact     If you have questions or need follow up information about today's clinic visit or your schedule please contact [facility] directly at [phone].  Normal or non-critical lab and imaging results will be communicated to you by MyChart, letter or phone within 4 business days after the clinic has received the results. If you do not hear from us within 7 days, please contact the clinic through MyChart or phone. If you have a critical or abnormal lab result, we will notify you by phone as soon as possible.  Submit refill requests through Eversnap or call your pharmacy and they will forward the refill request to us. Please allow 3 business days for your refill to  "be completed.          Additional Information About Your Visit        Insidehart Information     [employer] gives you secure access to your electronic health record. If you see a primary care provider, you can also send messages to your care team and make appointments. If you have questions, please call your primary care clinic.  If you do not have a primary care provider, please call [phone] and they will assist you.        Care EveryWhere ID     This is your Care EveryWhere ID. This could be used by other organizations to access your [facility] medical records  [id number]        Your Vitals Were     Pulse Temperature Respirations Height Pulse Oximetry BMI (Body Mass Index)    59 97.8  F (36.6  C) (Oral) 18 1.861 m (6' 1.25\") 99% 31.58 kg/m2       Blood Pressure from Last 3 Encounters:   08/27/18 122/80   07/12/18 136/78   06/18/18 123/67    Weight from Last 3 Encounters:   08/27/18 109.3 kg (241 lb)   07/12/18 107.7 kg (237 lb 8 oz)   06/08/18 108 kg (238 lb)              Today, you had the following     No orders found for display         Today's Medication Changes          These changes are accurate as of 8/27/18  2:24 PM.  If you have any questions, ask your nurse or doctor.               Start taking these medicines.        Dose/Directions    cephALEXin 500 MG capsule   Commonly known as:  KEFLEX   Used for:  Insect bite of left knee, initial encounter   Started by:  Jocelyn Greene, KATHIE        Dose:  500 mg   Take 1 capsule (500 mg) by mouth 3 times daily for 7 days   Quantity:  21 capsule   Refills:  0            Where to get your medicines      These medications were sent to [facility]/pharmacy #[id number] - [place], [place] - [address], [place] [address] [address] [address], [facility] [address]     Phone:  [phone]     cephALEXin 500 MG capsule                Primary Care Provider Office Phone # Fax #    [facility] [phone] [address] [facility] [place] [address]        Equal Access to Services     [facility]: Hadii michelle torres chantalduncan Yandyali, waaxda luqadaha, qaybta kaalmada braulio, cathy chong. So [facility] [phone].    ATENCIÓN: Si habla español, tiene a rosales disposición servicios gratuitos de asistencia lingüística. Celestinaame al [phone].    We comply with applicable federal civil rights laws and [place] laws. We do not discriminate on the basis of race, color, national origin, age, disability, sex, sexual orientation, or gender identity.            Thank you!     Thank you for choosing [facility]  for your care. Our goal is always to provide you with excellent care. Hearing back from our patients is one way we can continue to improve our services. Please take a few minutes to complete the written survey that you may receive in the mail after your visit with us. Thank you!             Your Updated Medication List - Protect others around you: Learn how to safely use, store and throw away your medicines at www.disposemymeds.org.          This list is accurate as of 8/27/18  2:24 PM.  Always use your most recent med list.                   Brand Name Dispense Instructions for use Diagnosis    Blood Pressure Monitor Kit     1 kit    1 Device daily    Enlarged aorta (H)       cephALEXin 500 MG capsule    KEFLEX    21 capsule    Take 1 capsule (500 mg) by mouth 3 times daily for 7 days    Insect bite of left knee, initial encounter       Fish Oil 1000 MG Cpdr      Take 1,000 mg by mouth

## 2018-08-27 NOTE — PROGRESS NOTES
SUBJECTIVE:   Bang Donis is a 58 year old male who presents to clinic today for the following health issues:      Concern - Bug Bite on knee  Onset: 3-4 weeks ago    Description:   On the LT knee bug bite that was about the size of a tennis ball and it was good for a week then it increased in size and it's warm to the touch    Intensity: severe    Progression of Symptoms:  worsening    Accompanying Signs & Symptoms:  swelling    Previous history of similar problem:   no    Precipitating factors:   Worsened by: bug bite    Alleviating factors:  Improved by: nothing    Therapies Tried and outcome: hydrocortisone cream helped at first but now it has no effect    No fever/chills. Knee is a little stiff, but walked 18 holes golf 1 day ago.  He states his knee, and the redness is increasing each day.  Today it is much more higher up on his thigh and around his knee than it was yesterday.  Slight tenderness, it is warm.        Problem list and histories reviewed & adjusted, as indicated.  Additional history: as documented    Patient Active Problem List   Diagnosis     CARDIOVASCULAR SCREENING; LDL GOAL LESS THAN 160     Obesity (BMI 30-39.9)     First degree AV block     Right groin hernia     Urinary frequency     Past Surgical History:   Procedure Laterality Date     COLONOSCOPY  7/9/2012    Procedure: COLONOSCOPY;  Colonoscopy ;  Surgeon: Homar Leger MD;  Location:  OR     LAPAROSCOPIC HERNIORRHAPHY INGUINAL BILATERAL Bilateral 6/18/2018    Procedure: LAPAROSCOPIC HERNIORRHAPHY INGUINAL BILATERAL;  Laparoscopic right inguinal hernia repair with mesh;  Surgeon: Jenna Akins MD;  Location:  OR       Social History   Substance Use Topics     Smoking status: Never Smoker     Smokeless tobacco: Never Used     Alcohol use Yes      Comment: occasional     Family History   Problem Relation Age of Onset     C.A.D. Father      Colon Cancer Father      in 80s     Asthma Brother      Colon Cancer Sister       "63      Diabetes No family hx of      Hypertension No family hx of      Cerebrovascular Disease No family hx of      Breast Cancer No family hx of      Cancer - colorectal No family hx of      Prostate Cancer No family hx of            Reviewed and updated as needed this visit by clinical staff  Tobacco  Allergies  Meds  Med Hx  Surg Hx  Fam Hx  Soc Hx      Reviewed and updated as needed this visit by Provider         ROS:  skin    OBJECTIVE:     /80 (BP Location: Right arm, Patient Position: Sitting, Cuff Size: Adult Regular)  Pulse 59  Temp 97.8  F (36.6  C) (Oral)  Resp 18  Ht 1.861 m (6' 1.25\")  Wt 109.3 kg (241 lb)  SpO2 99%  BMI 31.58 kg/m2  Body mass index is 31.58 kg/(m^2).  GENERAL: healthy, alert and no distress  SKIN: left knee with irregular erythema surrounding his knee, warm to the touch, slightly swollen, slightly tender to touch. No left calf pain or erythema/reddness.     Diagnostic Test Results:  none     ASSESSMENT/PLAN:       1. Insect bite of left knee, initial encounter  Treat for possible start of cellulitis.  He states that the redness is much more today than it was one day ago.  If not improving in 2 days call and can try a stronger antibiotic.  - cephALEXin (KEFLEX) 500 MG capsule; Take 1 capsule (500 mg) by mouth 3 times daily for 7 days  Dispense: 21 capsule; Refill: 0        FUTURE APPOINTMENTS:       - Follow-up visit in few days if not improving    SUDHEER Manriquez, NP-C  [facility]    "

## 2020-02-23 ENCOUNTER — HEALTH MAINTENANCE LETTER (OUTPATIENT)
Age: 60
End: 2020-02-23

## 2020-09-16 NOTE — PROGRESS NOTES
Chief Complaint: right groin bulge    History of Present Illness:   58 year old male sent in consultation by Sanaz Hauser PA-C for evaluation of a right groin protrusion which was noted about 3 weeks ago. He had minimal pain, and had noted some discomfort with carrying his golf bag. This was partly alleviated with the use of compression shorts. Over the last weekend, when he was busy and active all day, he noted increasnig burning discomfort which was alleviated with rest.  He denies any radiation of the pain and denies any episodes of incarceration.       No past medical history on file.  Past Surgical History:   Procedure Laterality Date     COLONOSCOPY  7/9/2012    Procedure: COLONOSCOPY;  Colonoscopy ;  Surgeon: Homar Leger MD;  Location: [facility] OR     No current outpatient prescriptions on file.     No current facility-administered medications for this visit.       No Known Allergies  Social History     Social History     Marital status:      Spouse name: N/A     Number of children: N/A     Years of education: N/A     Occupational History     computer programer [employer]     Social History Main Topics     Smoking status: Never Smoker     Smokeless tobacco: Never Used     Alcohol use Yes      Comment: occasional     Drug use: No     Sexual activity: Yes     Partners: Female     Other Topics Concern      Service No     Blood Transfusions No     Caffeine Concern No     Occupational Exposure No     Hobby Hazards No     Sleep Concern No     Stress Concern No     Weight Concern No     Special Diet No     Back Care No     Exercise Yes     4-5 times per week     Seat Belt Yes     Self-Exams No     Social History Narrative     Family History   Problem Relation Age of Onset     C.A.D. Father      Colon Cancer Father      in 80s     Asthma Brother      Colon Cancer Sister      63      DIABETES No family hx of      Hypertension No family hx of      CEREBROVASCULAR DISEASE No family hx of   LOV: 6/29/20  RTC: 7/5/21     "    Breast Cancer No family hx of      Cancer - colorectal No family hx of      Prostate Cancer No family hx of            Review of Systems:  No chest pain, dyspnea, fevers or night sweats. Lost 60 lbs several years ago, and has maintained it  All other systems questioned and negative.     Exam:  Vital signs for exam: /78  Pulse 62  Ht 1.861 m (6' 1.25\")  Wt 107.5 kg (237 lb 1.6 oz)  BMI 31.07 kg/m2  Constitutional: healthy, alert and no distress.  Head: Normocephalic. No masses, lesions, tenderness or abnormalities.  ENT: ENT exam normal, no neck nodes or sinus tenderness.  Cardiovascular: Normal S1, S2, no murmurs  Respiratory: Clear to auscultation.   Gastrointestinal:  Abdomen soft, non-tender. No masses, organomegaly.  : reducible right inguinal hernia, normal testicular exam  Musculoskeletal: extremities normal- no gross deformities noted and normal muscle tone  Skin: no jaundice, rashes or petechiae.   Neurologic: Gait normal.  Psychiatric: Mentation appears normal and affect normal.    IMPRESSION AND PLAN:  Symptomatic right inguinal hernia in an active healthy man. We discussed options and he would like to proceed with a laparoscopic inguinal hernia repair. Risks of the procedure including visceral injury, bleeding, infection, conversion to open, recurrence, chronic groin pain, contralateral exploration and mesh placement explained to the patient who understands and is willing to proceed.    "

## 2020-12-06 ENCOUNTER — HEALTH MAINTENANCE LETTER (OUTPATIENT)
Age: 60
End: 2020-12-06

## 2021-04-11 ENCOUNTER — HEALTH MAINTENANCE LETTER (OUTPATIENT)
Age: 61
End: 2021-04-11

## 2021-09-25 ENCOUNTER — HEALTH MAINTENANCE LETTER (OUTPATIENT)
Age: 61
End: 2021-09-25

## 2021-09-28 ENCOUNTER — NURSE TRIAGE (OUTPATIENT)
Dept: FAMILY MEDICINE | Facility: CLINIC | Age: 61
End: 2021-09-28

## 2021-09-28 NOTE — TELEPHONE ENCOUNTER
"Patient calling for severe lower back pain (flank pain) that started today that made him puke, which was led up by 2 weeks of urinary difficulty, possible UTI as the culprit.     RN advised patient to be seen now due to back pain being so severe and making him puke. Protocol also advises that an evaluation today needs to occur.    UC is advised and if unable to make this work with the 8pm close time that the ED is the next best option.     Patient understood the above recommendation and will follow through.    Kerry Garcia RN      Reason for Disposition    Side (flank) or lower back pain present    Additional Information    Negative: Shock suspected (e.g., cold/pale/clammy skin, too weak to stand, low BP, rapid pulse)    Negative: Sounds like a life-threatening emergency to the triager    Negative: Followed a genital area injury    Negative: Followed a genital area injury (penis, scrotum)    Negative: Vaginal discharge    Negative: Pus (white, yellow) or bloody discharge from end of penis    Negative: Discomfort (pain, burning or stinging) when passing urine and pregnant    Negative: Discomfort (pain, burning or stinging) when passing urine and female    Negative: Discomfort (pain, burning or stinging) when passing urine and male    Negative: Pain or itching in the vulvar area    Negative: Pain in scrotum is main symptom    Negative: Blood in the urine is main symptom    Negative: Symptoms arising from use of a urinary catheter (Scott or Coude)    Negative: Unable to urinate (or only a few drops) > 4 hours and bladder feels very full (e.g., palpable bladder or strong urge to urinate)    Negative: Decreased urination and drinking very little and dehydration suspected (e.g., dark urine, no urine > 12 hours, very dry mouth, very lightheaded)    Negative: Patient sounds very sick or weak to the triager    Negative: Fever > 100.4 F (38.0 C)    Answer Assessment - Initial Assessment Questions  1. SYMPTOM: \"What's the " "main symptom you're concerned about?\" (e.g., frequency, incontinence)      Lower back pain started today, frequency of urination has been going on for a couple weeks  2. ONSET: \"When did the  Frequency  start?\"      Couple weeks ago  3. PAIN: \"Is there any pain?\" If so, ask: \"How bad is it?\" (Scale: 1-10; mild, moderate, severe)      *No Answer*  4. CAUSE: \"What do you think is causing the symptoms?\"      *No Answer*  5. OTHER SYMPTOMS: \"Do you have any other symptoms?\" (e.g., fever, flank pain, blood in urine, pain with urination)      *No Answer*  6. PREGNANCY: \"Is there any chance you are pregnant?\" \"When was your last menstrual period?\"      *No Answer*    Protocols used: URINARY SYMPTOMS-A-[place]      "

## 2022-05-07 ENCOUNTER — HEALTH MAINTENANCE LETTER (OUTPATIENT)
Age: 62
End: 2022-05-07

## 2023-01-07 ENCOUNTER — HEALTH MAINTENANCE LETTER (OUTPATIENT)
Age: 63
End: 2023-01-07

## 2023-04-22 ENCOUNTER — HEALTH MAINTENANCE LETTER (OUTPATIENT)
Age: 63
End: 2023-04-22

## 2023-12-28 ASSESSMENT — ENCOUNTER SYMPTOMS
WEAKNESS: 0
NERVOUS/ANXIOUS: 0
FREQUENCY: 1
PALPITATIONS: 0
DIARRHEA: 0
HEMATURIA: 0
EYE PAIN: 0
DIZZINESS: 0
CHILLS: 0
SHORTNESS OF BREATH: 0
CONSTIPATION: 0
NAUSEA: 0
PARESTHESIAS: 0
DYSURIA: 0
JOINT SWELLING: 0
MYALGIAS: 0
SORE THROAT: 0
ABDOMINAL PAIN: 0
FEVER: 0
HEARTBURN: 0
COUGH: 0
HEMATOCHEZIA: 0
ARTHRALGIAS: 0
HEADACHES: 0

## 2023-12-28 ASSESSMENT — ACTIVITIES OF DAILY LIVING (ADL): CURRENT_FUNCTION: NO ASSISTANCE NEEDED

## 2024-02-21 ENCOUNTER — OFFICE VISIT (OUTPATIENT)
Dept: FAMILY MEDICINE | Facility: CLINIC | Age: 64
End: 2024-02-21
Payer: COMMERCIAL

## 2024-02-21 VITALS
OXYGEN SATURATION: 98 % | SYSTOLIC BLOOD PRESSURE: 137 MMHG | HEIGHT: 73 IN | BODY MASS INDEX: 29.93 KG/M2 | DIASTOLIC BLOOD PRESSURE: 83 MMHG | WEIGHT: 225.8 LBS | HEART RATE: 61 BPM | TEMPERATURE: 98.5 F | RESPIRATION RATE: 17 BRPM

## 2024-02-21 DIAGNOSIS — Z12.11 SCREEN FOR COLON CANCER: ICD-10-CM

## 2024-02-21 DIAGNOSIS — Z13.1 SCREENING FOR DIABETES MELLITUS: ICD-10-CM

## 2024-02-21 DIAGNOSIS — Z13.220 SCREENING FOR LIPID DISORDERS: ICD-10-CM

## 2024-02-21 DIAGNOSIS — N13.8 BPH WITH OBSTRUCTION/LOWER URINARY TRACT SYMPTOMS: ICD-10-CM

## 2024-02-21 DIAGNOSIS — N20.0 NEPHROLITHIASIS: ICD-10-CM

## 2024-02-21 DIAGNOSIS — Z11.59 NEED FOR HEPATITIS C SCREENING TEST: ICD-10-CM

## 2024-02-21 DIAGNOSIS — N40.1 BPH WITH OBSTRUCTION/LOWER URINARY TRACT SYMPTOMS: ICD-10-CM

## 2024-02-21 DIAGNOSIS — Z11.4 SCREENING FOR HIV (HUMAN IMMUNODEFICIENCY VIRUS): Primary | ICD-10-CM

## 2024-02-21 DIAGNOSIS — Z00.00 MEDICARE ANNUAL WELLNESS VISIT, INITIAL: ICD-10-CM

## 2024-02-21 DIAGNOSIS — Z80.0 FAMILY HISTORY OF COLON CANCER: ICD-10-CM

## 2024-02-21 PROCEDURE — G0402 INITIAL PREVENTIVE EXAM: HCPCS | Performed by: INTERNAL MEDICINE

## 2024-02-21 RX ORDER — TAMSULOSIN HYDROCHLORIDE 0.4 MG/1
0.4 CAPSULE ORAL DAILY
COMMUNITY
Start: 2024-02-21

## 2024-02-21 SDOH — HEALTH STABILITY: PHYSICAL HEALTH: ON AVERAGE, HOW MANY MINUTES DO YOU ENGAGE IN EXERCISE AT THIS LEVEL?: 60 MIN

## 2024-02-21 SDOH — HEALTH STABILITY: PHYSICAL HEALTH: ON AVERAGE, HOW MANY DAYS PER WEEK DO YOU ENGAGE IN MODERATE TO STRENUOUS EXERCISE (LIKE A BRISK WALK)?: 6 DAYS

## 2024-02-21 ASSESSMENT — PAIN SCALES - GENERAL: PAINLEVEL: NO PAIN (0)

## 2024-02-21 ASSESSMENT — ACTIVITIES OF DAILY LIVING (ADL): CURRENT_FUNCTION: NO ASSISTANCE NEEDED

## 2024-02-21 ASSESSMENT — SOCIAL DETERMINANTS OF HEALTH (SDOH): HOW OFTEN DO YOU GET TOGETHER WITH FRIENDS OR RELATIVES?: ONCE A WEEK

## 2024-02-21 NOTE — PROGRESS NOTES
"Preventive Care Visit  [facility]  Melchor Calvin MD, Internal Medicine  Feb 21, 2024        Assessment & Plan     1.  Medicare annual wellness visit initial exam.  Overall exam is good.  2.  Screening for colon cancer.  Patient referred for colonoscopy exam.  3.  Family history of colon cancer with a sibling and a father with colon cancer history.  4.  Benign prostate hypertrophy with lower urinary tract symptoms being treated with tamsulosin by urology.  5.  Nephrolithiasis.  Followed by urology once a year.  6.  Screening for diabetes and lipid disorder.  Fasting blood sugar and lipids to be performed.  Also screening for one-time hepatitis C and HIV.  I will get back to him with the results.        BMI  Estimated body mass index is 29.79 kg/m  as calculated from the following:    Height as of this encounter: 1.854 m (6' 1\").    Weight as of this encounter: 102.4 kg (225 lb 12.8 oz).   Weight management plan: Discussed healthy diet and exercise guidelines    Counseling  Appropriate preventive services were discussed with this patient, including applicable screening as appropriate for fall prevention, nutrition, physical activity, Tobacco-use cessation, weight loss and cognition.  Checklist reviewing preventive services available has been given to the patient.  Reviewed patient's diet, addressing concerns and/or questions.   He is at risk for psychosocial distress and has been provided with information to reduce risk.       Subjective   Bang Donis is a 63 year old, presenting for the following:  Wellness Visit (Welcome to medicare visit)    Patient indicates that he was diagnosed with a kidney stone couple of years ago in the past 1.  He sees a urologist once a year.  He still has couple of small renal stones.  He also has benign prostatic hypertrophy with lower urinary symptoms.  He has nocturia between 3-4 times at night.  He is currently on tamsulosin.  He gets yearly PSA with his " "urologist.  Otherwise he is doing well and offers no new concerns.  Patient exercises regularly.  He is retired.  He does not smoke or abuse alcohol.          2/21/2024     3:52 PM   Additional Questions   Roomed by Angeli NAIK   Accompanied by Self         2/21/2024     3:52 PM   Patient Reported Additional Medications   Patient reports taking the following new medications Tamsulosin        Health Care Directive  Patient does not have a Health Care Directive or Living Will: Discussed advance care planning with patient; information given to patient to review.    Healthy Habits:     In general, how would you rate your overall health?  Good    Frequency of exercise:  6-7 days/week    Duration of exercise:  45-60 minutes    Do you usually eat at least 4 servings of fruit and vegetables a day, include whole grains    & fiber and avoid regularly eating high fat or \"junk\" foods?  Yes    Taking medications regularly:  Yes    Medication side effects:  None    Ability to successfully perform activities of daily living:  No assistance needed    Home Safety:  No safety concerns identified    Hearing Impairment:  No hearing concerns    In the past 6 months, have you been bothered by leaking of urine?  No    In general, how would you rate your overall mental or emotional health?  Good            2/21/2024   General Health   How would you rate your overall physical health? Good   Feel stress (tense, anxious, or unable to sleep) Only a little   (!) STRESS CONCERN      2/21/2024   Nutrition   Diet: Regular (no restrictions)         2/21/2024   Exercise   Days per week of moderate/strenous exercise 6 days   Average minutes spent exercising at this level 60 min         2/21/2024   Social Factors   Frequency of gathering with friends or relatives Once a week   Worry food won't last until get money to buy more No    No   Food not last or not have enough money for food? No    No   Do you have housing?  Yes    Yes   Are you worried about " losing your housing? No    No   Lack of transportation? No    No   Unable to get utilities (heat,electricity)? No    No         2/21/2024   Fall Risk   Fallen 2 or more times in the past year? No   Trouble with walking or balance? No          2/21/2024   Activities of Daily Living- Home Safety   Needs help with the following daily activites None of the above   Safety concerns in the home None of the above         2/21/2024   Dental   Dentist two times every year? Yes         2/21/2024   Hearing Screening   Hearing concerns? None of the above         2/21/2024   Driving Risk Screening   Patient/family members have concerns about driving No         2/21/2024   General Alertness/Fatigue Screening   Have you been more tired than usual lately? No         2/21/2024   Urinary Incontinence Screening   Bothered by leaking urine in past 6 months No         2/21/2024   TB Screening   Were you born outside of [place]?  No         Today's PHQ-2 Score:       2/21/2024     8:12 AM   PHQ-2 ( 1999 Pf[employer])   Q1: Little interest or pleasure in doing things 0   Q2: Feeling down, depressed or hopeless 0   PHQ-2 Score 0   Q1: Little interest or pleasure in doing things Not at all   Q2: Feeling down, depressed or hopeless Not at all   PHQ-2 Score 0           2/21/2024   Substance Use   Alcohol more than 3/day or more than 7/wk No   Do you have a current opioid prescription? No   How severe/bad is pain from 1 to 10? 2/10   Do you use any other substances recreationally? No     Social History     Tobacco Use    Smoking status: Never    Smokeless tobacco: Never   Vaping Use    Vaping Use: Never used   Substance Use Topics    Alcohol use: Yes     Comment: very little use    Drug use: No           2/21/2024   AAA Screening   Family history of Abdominal Aortic Aneurysm (AAA)? No         2/21/2024   One time HIV Screening   Previous HIV test? No   Last PSA:   PSA   Date Value Ref Range Status   06/08/2018 0.42 0 - 4 ug/L Final     Comment:     Assay  Method:  Chemiluminescence using Siemens Vista analyzer     The ASCVD Risk score (Salome GARZA, et al., 2019) failed to calculate for the following reasons:    Cannot find a previous HDL lab    Cannot find a previous total cholesterol lab            Reviewed and updated as needed this visit by Provider                    Past Medical History:   Diagnosis Date    Family history of colon cancer 02/21/2024    Nephrolithiasis 02/10/2022     Past Surgical History:   Procedure Laterality Date    COLONOSCOPY  7/9/2012    Procedure: COLONOSCOPY;  Colonoscopy ;  Surgeon: Homar Leger MD;  Location: [facility] OR    LAPAROSCOPIC HERNIORRHAPHY INGUINAL BILATERAL Bilateral 6/18/2018    Procedure: LAPAROSCOPIC HERNIORRHAPHY INGUINAL BILATERAL;  Laparoscopic right inguinal hernia repair with mesh;  Surgeon: Jenna Akins MD;  Location:  OR     Current providers sharing in care for this patient include:  Patient Care Team:  Clinic - [place], [facility] as PCP - General    The following health maintenance items are reviewed in Epic and correct as of today:  Health Maintenance   Topic Date Due    ANNUAL REVIEW OF HM ORDERS  Never done    ADVANCE CARE PLANNING  Never done    HIV SCREENING  Never done    HEPATITIS C SCREENING  Never done    ZOSTER IMMUNIZATION (1 of 2) Never done    RSV VACCINE (Pregnancy & 60+) (1 - 1-dose 60+ series) Never done    GLUCOSE  06/08/2021    COLORECTAL CANCER SCREENING  07/09/2022    MEDICARE ANNUAL WELLNESS VISIT  03/12/2023    Pneumococcal Vaccine: 65+ Years (1 of 1 - PCV) Never done    LIPID  06/08/2023    INFLUENZA VACCINE (1) Never done    COVID-19 Vaccine (4 - 2023-24 season) 09/01/2023    FALL RISK ASSESSMENT  02/21/2025    DTAP/TDAP/TD IMMUNIZATION (2 - Td or Tdap) 11/08/2030    PHQ-2 (once per calendar year)  Completed    IPV IMMUNIZATION  Aged Out    HPV IMMUNIZATION  Aged Out    MENINGITIS IMMUNIZATION  Aged Out    RSV MONOCLONAL ANTIBODY  Aged Out         Review of  "Systems  Constitutional, HEENT, cardiovascular, pulmonary, GI, , musculoskeletal, neuro, skin, endocrine and psych systems are negative, except as otherwise noted.     Objective    Exam  /83 (BP Location: Right arm, Patient Position: Sitting, Cuff Size: Adult Regular)   Pulse 61   Temp 98.5  F (36.9  C) (Temporal)   Resp 17   Ht 1.854 m (6' 1\")   Wt 102.4 kg (225 lb 12.8 oz)   SpO2 98%   BMI 29.79 kg/m     Estimated body mass index is 29.79 kg/m  as calculated from the following:    Height as of this encounter: 1.854 m (6' 1\").    Weight as of this encounter: 102.4 kg (225 lb 12.8 oz).    Physical Exam  GENERAL: alert and no distress  EYES: Eyes grossly normal to inspection, PERRL and conjunctivae and sclerae normal  HENT: ear canals and TM's normal, nose and mouth without ulcers or lesions  NECK: no adenopathy, no asymmetry, masses, or scars  RESP: lungs clear to auscultation - no rales, rhonchi or wheezes  CV: regular rate and rhythm, normal S1 S2, no S3 or S4, no murmur, click or rub, no peripheral edema  ABDOMEN: soft, nontender, no hepatosplenomegaly, no masses and bowel sounds normal   (male): normal male genitalia without lesions or urethral discharge, no hernia  MS: no gross musculoskeletal defects noted, no edema  SKIN: no suspicious lesions or rashes  NEURO: Normal strength and tone, mentation intact and speech normal  PSYCH: mentation appears normal, affect normal/bright  LYMPH: no cervical, supraclavicular, axillary, or inguinal adenopathy         2/21/2024   Mini Cog   Clock Draw Score 2 Normal   3 Item Recall 1 object recalled   Mini Cog Total Score 3         Vision Screen  Vision Screen Results: Pass    Signed Electronically by: Melchor Calvin MD    "

## 2024-02-29 ENCOUNTER — LAB (OUTPATIENT)
Dept: LAB | Facility: CLINIC | Age: 64
End: 2024-02-29
Payer: COMMERCIAL

## 2024-02-29 ENCOUNTER — TELEPHONE (OUTPATIENT)
Dept: GASTROENTEROLOGY | Facility: CLINIC | Age: 64
End: 2024-02-29

## 2024-02-29 DIAGNOSIS — Z13.220 SCREENING FOR LIPID DISORDERS: ICD-10-CM

## 2024-02-29 DIAGNOSIS — Z13.1 SCREENING FOR DIABETES MELLITUS: ICD-10-CM

## 2024-02-29 DIAGNOSIS — Z11.4 SCREENING FOR HIV (HUMAN IMMUNODEFICIENCY VIRUS): ICD-10-CM

## 2024-02-29 DIAGNOSIS — Z11.59 NEED FOR HEPATITIS C SCREENING TEST: ICD-10-CM

## 2024-02-29 LAB
CHOLEST SERPL-MCNC: 178 MG/DL
FASTING STATUS PATIENT QL REPORTED: YES
FASTING STATUS PATIENT QL REPORTED: YES
GLUCOSE SERPL-MCNC: 95 MG/DL (ref 70–99)
HCV AB SERPL QL IA: NONREACTIVE
HDLC SERPL-MCNC: 39 MG/DL
HIV 1+2 AB+HIV1 P24 AG SERPL QL IA: NONREACTIVE
LDLC SERPL CALC-MCNC: 116 MG/DL
NONHDLC SERPL-MCNC: 139 MG/DL
TRIGL SERPL-MCNC: 113 MG/DL

## 2024-02-29 PROCEDURE — 87389 HIV-1 AG W/HIV-1&-2 AB AG IA: CPT

## 2024-02-29 PROCEDURE — 80061 LIPID PANEL: CPT

## 2024-02-29 PROCEDURE — 36415 COLL VENOUS BLD VENIPUNCTURE: CPT

## 2024-02-29 PROCEDURE — 86803 HEPATITIS C AB TEST: CPT

## 2024-02-29 PROCEDURE — 82947 ASSAY GLUCOSE BLOOD QUANT: CPT

## 2024-02-29 NOTE — TELEPHONE ENCOUNTER
"Endoscopy Scheduling Screen    Have you had a positive Covid test in the last 14 days?  No    Are you active on MyChart?   Yes    What insurance is in the chart?  Other:  [facility]/MEDICARE    Ordering/Referring Provider: Melchor Calvin MD     (If ordering provider performs procedure, schedule with ordering provider unless otherwise instructed. )    BMI: Estimated body mass index is 29.79 kg/m  as calculated from the following:    Height as of 2/21/24: 1.854 m (6' 1\").    Weight as of 2/21/24: 102.4 kg (225 lb 12.8 oz).     Sedation Ordered  moderate sedation.   If patient BMI > 50 do not schedule in [facility].    If patient BMI > 45 do not schedule at [facility].    Are you taking methadone or Suboxone?  No    Have you had difficulties, pain, or discomfort during past endoscopy procedures?  No    Are you taking any prescription medications for pain 3 or more times per week?   NO - No RN review required.    Do you have a history of malignant hyperthermia or adverse reaction to anesthesia?  No    (Females) Are you currently pregnant?   No     Have you been diagnosed or told you have pulmonary hypertension?   No    Do you have an LVAD?  No    Have you been told you have moderate to severe sleep apnea?  No    Have you been told you have COPD, asthma, or any other lung disease?  No    Do you have any heart conditions?  No     Have you ever had an organ transplant?   No    Have you ever had or are you awaiting a heart or lung transplant?   No    Have you had a stroke or transient ischemic attack (TIA aka \"mini stroke\" in the last 6 months?   No    Have you been diagnosed with or been told you have cirrhosis of the liver?   No    Are you currently on dialysis?   No    Do you need assistance transferring?   No    BMI: Estimated body mass index is 29.79 kg/m  as calculated from the following:    Height as of 2/21/24: 1.854 m (6' 1\").    Weight as of 2/21/24: 102.4 kg (225 lb 12.8 oz).     Is patients BMI > 40 and scheduling location " [facility]?  No    Do you take an injectable medication for weight loss or diabetes (excluding insulin)?  No    Do you take the medication Naltrexone?  No    Do you take blood thinners?  No       Prep   Are you currently on dialysis or do you have chronic kidney disease?  No    Do you have a diagnosis of diabetes?  No    Do you have a diagnosis of cystic fibrosis (CF)?  No    On a regular basis do you go 3 -5 days between bowel movements?  No    BMI > 40?  No    Preferred Pharmacy:      [facility]/pharmacy #[id number] [facility] [address] [facility]  [address] [facility] [address]  Phone: [phone] Fax: [phone]      Final Scheduling Details   Colonoscopy prep sent?  Standard MiraLAX    Procedure scheduled  Colonoscopy    Surgeon:  EMELYN     Date of procedure:  4/24     Pre-OP / PAC:   No - Not required for this site.    Location  [facility] - [facility] - Patient preference.    Sedation   Moderate Sedation - Per order.      Patient Reminders:   You will receive a call from a Nurse to review instructions and health history.  This assessment must be completed prior to your procedure.  Failure to complete the Nurse assessment may result in the procedure being cancelled.      On the day of your procedure, please designate an adult(s) who can drive you home stay with you for the next 24 hours. The medicines used in the exam will make you sleepy. You will not be able to drive.      You cannot take public transportation, ride share services, or non-medical taxi service without a responsible caregiver.  Medical transport services are allowed with the requirement that a responsible caregiver will receive you at your destination.  We require that drivers and caregivers are confirmed prior to your procedure.

## 2024-04-09 ENCOUNTER — TELEPHONE (OUTPATIENT)
Dept: GASTROENTEROLOGY | Facility: CLINIC | Age: 64
End: 2024-04-09
Payer: COMMERCIAL

## 2024-04-09 NOTE — TELEPHONE ENCOUNTER
Pre visit planning completed.      Procedure details:    Patient scheduled for Colonoscopy  on 4/24/24.     Arrival time: 0645. Procedure time 0730    Facility location: [facility] [place]; [address] Floor, [place] [address]. Check in location: 2nd Floor at Surgery desk.    Sedation type: Conscious sedation     Pre op exam needed? N/A    Indication for procedure:   Screen for colon cancer   Family history of colon cancer         Chart review:     Electronic implanted devices? No    Recent diagnosis of diverticulitis within the last 6 weeks? No    Diabetic? No      Medication review:    Anticoagulants? No    NSAIDS? No NSAID medications per patient's medication list.  RN will verify with pre-assessment call.    Other medication HOLDING recommendations:  N/A      Prep for procedure:     Bowel prep recommendation: Standard Miralax  Due to: standard bowel prep.    Prep instructions sent via InboundWriter         Corinne Kliber, RN  Endoscopy Procedure Pre Assessment RN  [phone] option 4

## 2024-04-10 NOTE — TELEPHONE ENCOUNTER
Pre assessment completed for upcoming procedure.   (Please see previous telephone encounter notes for complete details)    Patient  returned call.       Procedure details:    Arrival time and facility location reviewed.    Pre op exam needed? N/A    Designated  policy reviewed. Instructed to have someone stay 6  hours post procedure.       Medication review:    Medications reviewed. Please see supporting documentation below. Holding recommendations discussed (if applicable).   NSAID medication(s): Ibuprofen (Advil, Motrin): HOLD 1 day before procedure.      Prep for procedure:     Procedure prep instructions NOT reviewed.  Patient felt comfortable with all of the prep instructions- reiterated magnesium citrate timing, and NPO time.        Any additional information needed:  N/A      Patient  verbalized understanding and had no questions or concerns at this time.      Shira Shin RN  Endoscopy Procedure Pre Assessment RN  [phone] option 4

## 2024-04-10 NOTE — TELEPHONE ENCOUNTER
Called the Pt to complete Pre-Assessment. First Attempt. No answer, Left message.     Lorena Camilo RN   Endoscopy Procedure Pre Assessment RN

## 2024-04-24 ENCOUNTER — HOSPITAL ENCOUNTER (OUTPATIENT)
Facility: AMBULATORY SURGERY CENTER | Age: 64
Discharge: HOME | End: 2024-04-24
Attending: STUDENT IN AN ORGANIZED HEALTH CARE EDUCATION/TRAINING PROGRAM | Admitting: STUDENT IN AN ORGANIZED HEALTH CARE EDUCATION/TRAINING PROGRAM
Payer: COMMERCIAL

## 2024-04-24 VITALS
DIASTOLIC BLOOD PRESSURE: 83 MMHG | SYSTOLIC BLOOD PRESSURE: 135 MMHG | TEMPERATURE: 97.3 F | OXYGEN SATURATION: 97 % | RESPIRATION RATE: 16 BRPM | HEART RATE: 63 BPM

## 2024-04-24 LAB — COLONOSCOPY: NORMAL

## 2024-04-24 PROCEDURE — G8918 PT W/O PREOP ORDER IV AB PRO: HCPCS

## 2024-04-24 PROCEDURE — G8907 PT DOC NO EVENTS ON DISCHARG: HCPCS

## 2024-04-24 PROCEDURE — 88341 IMHCHEM/IMCYTCHM EA ADD ANTB: CPT | Performed by: PATHOLOGY

## 2024-04-24 PROCEDURE — 45380 COLONOSCOPY AND BIOPSY: CPT | Mod: PT

## 2024-04-24 PROCEDURE — 88342 IMHCHEM/IMCYTCHM 1ST ANTB: CPT | Performed by: PATHOLOGY

## 2024-04-24 PROCEDURE — 88305 TISSUE EXAM BY PATHOLOGIST: CPT | Performed by: PATHOLOGY

## 2024-04-24 RX ORDER — NALOXONE HYDROCHLORIDE 0.4 MG/ML
0.2 INJECTION, SOLUTION INTRAMUSCULAR; INTRAVENOUS; SUBCUTANEOUS
Status: DISCONTINUED | OUTPATIENT
Start: 2024-04-24 | End: 2024-04-25 | Stop reason: HOSPADM

## 2024-04-24 RX ORDER — ONDANSETRON 4 MG/1
4 TABLET, ORALLY DISINTEGRATING ORAL EVERY 6 HOURS PRN
Status: DISCONTINUED | OUTPATIENT
Start: 2024-04-24 | End: 2024-04-25 | Stop reason: HOSPADM

## 2024-04-24 RX ORDER — NALOXONE HYDROCHLORIDE 0.4 MG/ML
0.4 INJECTION, SOLUTION INTRAMUSCULAR; INTRAVENOUS; SUBCUTANEOUS
Status: DISCONTINUED | OUTPATIENT
Start: 2024-04-24 | End: 2024-04-25 | Stop reason: HOSPADM

## 2024-04-24 RX ORDER — ONDANSETRON 2 MG/ML
4 INJECTION INTRAMUSCULAR; INTRAVENOUS EVERY 6 HOURS PRN
Status: DISCONTINUED | OUTPATIENT
Start: 2024-04-24 | End: 2024-04-25 | Stop reason: HOSPADM

## 2024-04-24 RX ORDER — FLUMAZENIL 0.1 MG/ML
0.2 INJECTION, SOLUTION INTRAVENOUS
Status: ACTIVE | OUTPATIENT
Start: 2024-04-24 | End: 2024-04-24

## 2024-04-24 RX ORDER — PROCHLORPERAZINE MALEATE 5 MG
5 TABLET ORAL EVERY 6 HOURS PRN
Status: DISCONTINUED | OUTPATIENT
Start: 2024-04-24 | End: 2024-04-25 | Stop reason: HOSPADM

## 2024-04-24 RX ORDER — FENTANYL CITRATE 50 UG/ML
INJECTION, SOLUTION INTRAMUSCULAR; INTRAVENOUS PRN
Status: DISCONTINUED | OUTPATIENT
Start: 2024-04-24 | End: 2024-04-24 | Stop reason: HOSPADM

## 2024-04-29 LAB
PATH REPORT.COMMENTS IMP SPEC: NORMAL
PATH REPORT.FINAL DX SPEC: NORMAL
PATH REPORT.GROSS SPEC: NORMAL
PATH REPORT.MICROSCOPIC SPEC OTHER STN: NORMAL
PATH REPORT.RELEVANT HX SPEC: NORMAL
PHOTO IMAGE: NORMAL

## 2024-11-12 ENCOUNTER — OFFICE VISIT (OUTPATIENT)
Dept: FAMILY MEDICINE | Facility: CLINIC | Age: 64
End: 2024-11-12
Payer: COMMERCIAL

## 2024-11-12 VITALS
OXYGEN SATURATION: 98 % | WEIGHT: 257.8 LBS | HEIGHT: 73 IN | HEART RATE: 60 BPM | TEMPERATURE: 97.9 F | DIASTOLIC BLOOD PRESSURE: 83 MMHG | RESPIRATION RATE: 16 BRPM | SYSTOLIC BLOOD PRESSURE: 132 MMHG | BODY MASS INDEX: 34.17 KG/M2

## 2024-11-12 DIAGNOSIS — N40.1 BPH WITH OBSTRUCTION/LOWER URINARY TRACT SYMPTOMS: ICD-10-CM

## 2024-11-12 DIAGNOSIS — Z01.818 PREOP GENERAL PHYSICAL EXAM: Primary | ICD-10-CM

## 2024-11-12 DIAGNOSIS — N13.8 BPH WITH OBSTRUCTION/LOWER URINARY TRACT SYMPTOMS: ICD-10-CM

## 2024-11-12 LAB
ANION GAP SERPL CALCULATED.3IONS-SCNC: 9 MMOL/L (ref 7–15)
BASOPHILS # BLD AUTO: 0 10E3/UL (ref 0–0.2)
BASOPHILS NFR BLD AUTO: 1 %
BUN SERPL-MCNC: 20.2 MG/DL (ref 8–23)
CALCIUM SERPL-MCNC: 9.3 MG/DL (ref 8.8–10.4)
CHLORIDE SERPL-SCNC: 104 MMOL/L (ref 98–107)
CREAT SERPL-MCNC: 1.07 MG/DL (ref 0.67–1.17)
EGFRCR SERPLBLD CKD-EPI 2021: 77 ML/MIN/1.73M2
EOSINOPHIL # BLD AUTO: 0.3 10E3/UL (ref 0–0.7)
EOSINOPHIL NFR BLD AUTO: 5 %
ERYTHROCYTE [DISTWIDTH] IN BLOOD BY AUTOMATED COUNT: 12.9 % (ref 10–15)
GLUCOSE SERPL-MCNC: 96 MG/DL (ref 70–99)
HCO3 SERPL-SCNC: 26 MMOL/L (ref 22–29)
HCT VFR BLD AUTO: 43.3 % (ref 40–53)
HGB BLD-MCNC: 14.6 G/DL (ref 13.3–17.7)
IMM GRANULOCYTES # BLD: 0 10E3/UL
IMM GRANULOCYTES NFR BLD: 0 %
LYMPHOCYTES # BLD AUTO: 1.3 10E3/UL (ref 0.8–5.3)
LYMPHOCYTES NFR BLD AUTO: 21 %
MCH RBC QN AUTO: 30.2 PG (ref 26.5–33)
MCHC RBC AUTO-ENTMCNC: 33.7 G/DL (ref 31.5–36.5)
MCV RBC AUTO: 90 FL (ref 78–100)
MONOCYTES # BLD AUTO: 0.5 10E3/UL (ref 0–1.3)
MONOCYTES NFR BLD AUTO: 8 %
NEUTROPHILS # BLD AUTO: 3.9 10E3/UL (ref 1.6–8.3)
NEUTROPHILS NFR BLD AUTO: 64 %
PLATELET # BLD AUTO: 203 10E3/UL (ref 150–450)
POTASSIUM SERPL-SCNC: 4.6 MMOL/L (ref 3.4–5.3)
RBC # BLD AUTO: 4.84 10E6/UL (ref 4.4–5.9)
SODIUM SERPL-SCNC: 139 MMOL/L (ref 135–145)
WBC # BLD AUTO: 6.1 10E3/UL (ref 4–11)

## 2024-11-12 ASSESSMENT — PAIN SCALES - GENERAL: PAINLEVEL_OUTOF10: NO PAIN (0)

## 2024-11-12 NOTE — PROGRESS NOTES
Preoperative Evaluation  [facility]  [address] [address]  Phone: [phone]  Primary Provider: [facility] Velma Aniak  Pre-op Performing Provider: Sanaz Hauser PA-C  Nov 12, 2024 11/9/2024   Surgical Information   What procedure is being done? Green light laser procedure    Facility or Hospital where procedure/surgery will be performed: [facility]    Who is doing the procedure / surgery? Dr Perez    Date of surgery / procedure: 12/5/24    Time of surgery / procedure: 7:15am    Where do you plan to recover after surgery? at home with family        Patient-reported     Fax number for surgical facility: [phone]    Assessment & Plan     The proposed surgical procedure is considered INTERMEDIATE risk.    Preop general physical exam  Sinus bradycardia with first degree av block- unchanged with previous EKG 2018 -  Had echocardiogram with mild aortic dilation 2018 and follow up with cardiology-recommended repeating in 6 month  No chest pain or shortness of breath   Medically optimized for surgery  Will follow up with PCP in February   Not anemic and normal basic metabolic panel   - EKG 12-lead complete w/read - Clinics  - CBC with platelets and differential  - Basic metabolic panel  (Ca, Cl, CO2, Creat, Gluc, K, Na, BUN)  - CBC with platelets and differential  - Basic metabolic panel  (Ca, Cl, CO2, Creat, Gluc, K, Na, BUN)    BPH with obstruction/lower urinary tract symptoms  Reason for surgery - on flomax              - No identified additional risk factors other than previously addressed    Preoperative Medication Instructions  Antiplatelet or Anticoagulation Medication Instructions   - Patient is on no antiplatelet or anticoagulation medications.    Additional Medication Instructions  Take all scheduled medications on the day of surgery   - Herbal medications and vitamins: DO NOT TAKE 14 days prior to  surgery.    Recommendation  Approval given to proceed with proposed procedure, without further diagnostic evaluation.    Subjective   Bang Donis is a 64 year old, presenting for the following:  Pre-Op Exam          11/12/2024     7:07 AM   Additional Questions   Roomed by Jamel   Accompanied by Self         11/12/2024     7:07 AM   Patient Reported Additional Medications   Patient reports taking the following new medications None     HPI related to upcoming procedure: patient unfamiliar to me with history of first degree AV block, obesity and BPH presents for preoperative exam.  Reports history of Urinary frequency- especially at night for years  Active with [employer]  Retired from [employer]       11/9/2024   Pre-Op Questionnaire   Have you ever had a heart attack or stroke? No    Have you ever had surgery on your heart or blood vessels, such as a stent placement, a coronary artery bypass, or surgery on an artery in your head, neck, heart, or legs? No    Do you have chest pain with activity? No    Do you have a history of heart failure? No    Do you currently have a cold, bronchitis or symptoms of other infection? No    Do you have a cough, shortness of breath, or wheezing? No    Do you or anyone in your family have previous history of blood clots? No    Do you or does anyone in your family have a serious bleeding problem such as prolonged bleeding following surgeries or cuts? No    Have you ever had problems with anemia or been told to take iron pills? No    Have you had any abnormal blood loss such as black, tarry or bloody stools? No    Have you ever had a blood transfusion? No    Are you willing to have a blood transfusion if it is medically needed before, during, or after your surgery? Yes    Have you or any of your relatives ever had problems with anesthesia? No    Do you have sleep apnea, excessive snoring or daytime drowsiness? No    Do you have any artifical heart valves or other implanted  medical devices like a pacemaker, defibrillator, or continuous glucose monitor? No    Do you have artificial joints? No    Are you allergic to latex? No        Patient-reported     Health Care Directive  Patient does not have a Health Care Directive: Discussed advance care planning with patient; however, patient declined at this time.    Preoperative Review of    reviewed - no record of controlled substances prescribed.      Status of Chronic Conditions:  See problem list for active medical problems.  Problems all longstanding and stable, except as noted/documented.  See ROS for pertinent symptoms related to these conditions.    Patient Active Problem List    Diagnosis Date Noted    Family history of colon cancer 02/21/2024     Priority: Medium    BPH with obstruction/lower urinary tract symptoms 02/21/2024     Priority: Medium    Nephrolithiasis 02/10/2022     Priority: Medium    First degree AV block 06/08/2018     Priority: Medium    Right groin hernia 06/08/2018     Priority: Medium    Urinary frequency 06/08/2018     Priority: Medium    Obesity (BMI 30-39.9) 05/24/2018     Priority: Medium    CARDIOVASCULAR SCREENING; LDL GOAL LESS THAN 160 01/31/2011     Priority: Medium      Past Medical History:   Diagnosis Date    Family history of colon cancer 02/21/2024    Nephrolithiasis 02/10/2022     Past Surgical History:   Procedure Laterality Date    COLONOSCOPY  7/9/2012    Procedure: COLONOSCOPY;  Colonoscopy ;  Surgeon: Homar Leger MD;  Location: [facility] OR    COLONOSCOPY N/A 4/24/2024    Procedure: COLONOSCOPY, WITH POLYPECTOMY AND BIOPSY;  Surgeon: Laura Delgadillo MD;  Location: [facility] OR    COLONOSCOPY WITH CO2 INSUFFLATION N/A 4/24/2024    Procedure: Colonoscopy with CO2 insufflation;  Surgeon: Laura Delgadillo MD;  Location: [facility] OR    LAPAROSCOPIC HERNIORRHAPHY INGUINAL BILATERAL Bilateral 6/18/2018    Procedure: LAPAROSCOPIC HERNIORRHAPHY INGUINAL BILATERAL;  Laparoscopic right inguinal hernia  "repair with mesh;  Surgeon: Jenna Akins MD;  Location: [facility] OR     Current Outpatient Medications   Medication Sig Dispense Refill    tamsulosin (FLOMAX) 0.4 MG capsule Take 1 capsule (0.4 mg) by mouth daily         No Known Allergies     Social History     Tobacco Use    Smoking status: Never    Smokeless tobacco: Never   Substance Use Topics    Alcohol use: Yes     Comment: very little use     Family History   Problem Relation Age of Onset    Other - See Comments Mother          age 91    C.A.D. Father     Colon Cancer Father         in 80s    Colon Cancer Sister 63    Colon Cancer Sister     Colon Polyps Sister     Asthma Brother     Ulcerative Colitis Son     Diabetes No family hx of     Hypertension No family hx of     Cerebrovascular Disease No family hx of     Breast Cancer No family hx of     Cancer - colorectal No family hx of     Prostate Cancer No family hx of      History   Drug Use No             Review of Systems  CONSTITUTIONAL: NEGATIVE for fever, chills, change in weight  INTEGUMENTARY/SKIN: NEGATIVE for worrisome rashes, moles or lesions  EYES: NEGATIVE for vision changes or irritation  ENT/MOUTH: NEGATIVE for ear, mouth and throat problems  RESP: NEGATIVE for significant cough or SOB  BREAST: NEGATIVE for masses, tenderness or discharge  CV: NEGATIVE for chest pain, palpitations or peripheral edema  GI: NEGATIVE for nausea, abdominal pain, heartburn, or change in bowel habits   male :urinary frequency-see hpi   MUSCULOSKELETAL: NEGATIVE for significant arthralgias or myalgia  NEURO: NEGATIVE for weakness, dizziness or paresthesias  ENDOCRINE: NEGATIVE for temperature intolerance, skin/hair changes  HEME: NEGATIVE for bleeding problems  PSYCHIATRIC: NEGATIVE for changes in mood or affect    Objective    /83 (BP Location: Right arm, Patient Position: Sitting, Cuff Size: Adult Large)   Pulse 60   Temp 97.9  F (36.6  C) (Tympanic)   Resp 16   Ht 1.854 m (6' 1\")   Wt 116.9 kg " "(257 lb 12.8 oz)   SpO2 98%   BMI 34.01 kg/m     Estimated body mass index is 34.01 kg/m  as calculated from the following:    Height as of this encounter: 1.854 m (6' 1\").    Weight as of this encounter: 116.9 kg (257 lb 12.8 oz).  Physical Exam  GENERAL: alert, no distress, and obese  EYES: Eyes grossly normal to inspection, PERRL and conjunctivae and sclerae normal  HENT: ear canals and TM's normal, nose and mouth without ulcers or lesions  NECK: no adenopathy, no asymmetry, masses, or scars  RESP: lungs clear to auscultation - no rales, rhonchi or wheezes  CV: regular rate and rhythm, normal S1 S2, no S3 or S4, no murmur, click or rub, no peripheral edema  ABDOMEN: soft, nontender, no hepatosplenomegaly, no masses and bowel sounds normal  MS: no gross musculoskeletal defects noted, no edema  SKIN: no suspicious lesions or rashes  NEURO: Normal strength and tone, mentation intact and speech normal  PSYCH: mentation appears normal, affect normal/bright    No results for input(s): \"HGB\", \"PLT\", \"INR\", \"NA\", \"POTASSIUM\", \"CR\", \"A1C\" in the last 8760 hours.     Diagnostics  Recent Results (from the past 48 hours)   Basic metabolic panel  (Ca, Cl, CO2, Creat, Gluc, K, Na, BUN)    Collection Time: 11/12/24  7:57 AM   Result Value Ref Range    Sodium 139 135 - 145 mmol/L    Potassium 4.6 3.4 - 5.3 mmol/L    Chloride 104 98 - 107 mmol/L    Carbon Dioxide (CO2) 26 22 - 29 mmol/L    Anion Gap 9 7 - 15 mmol/L    Urea Nitrogen 20.2 8.0 - 23.0 mg/dL    Creatinine 1.07 0.67 - 1.17 mg/dL    GFR Estimate 77 >60 mL/min/1.73m2    Calcium 9.3 8.8 - 10.4 mg/dL    Glucose 96 70 - 99 mg/dL   CBC with platelets and differential    Collection Time: 11/12/24  7:57 AM   Result Value Ref Range    WBC Count 6.1 4.0 - 11.0 10e3/uL    RBC Count 4.84 4.40 - 5.90 10e6/uL    Hemoglobin 14.6 13.3 - 17.7 g/dL    Hematocrit 43.3 40.0 - 53.0 %    MCV 90 78 - 100 fL    MCH 30.2 26.5 - 33.0 pg    MCHC 33.7 31.5 - 36.5 g/dL    RDW 12.9 10.0 - 15.0 % "    Platelet Count 203 150 - 450 10e3/uL    % Neutrophils 64 %    % Lymphocytes 21 %    % Monocytes 8 %    % Eosinophils 5 %    % Basophils 1 %    % Immature Granulocytes 0 %    Absolute Neutrophils 3.9 1.6 - 8.3 10e3/uL    Absolute Lymphocytes 1.3 0.8 - 5.3 10e3/uL    Absolute Monocytes 0.5 0.0 - 1.3 10e3/uL    Absolute Eosinophils 0.3 0.0 - 0.7 10e3/uL    Absolute Basophils 0.0 0.0 - 0.2 10e3/uL    Absolute Immature Granulocytes 0.0 <=0.4 10e3/uL      EKG: sinus bradycardia, normal axis, normal intervals, no acute ST/T changes c/w ischemia, no LVH by voltage criteria, unchanged from previous tracings, first degree AV block     Revised Cardiac Risk Index (RCRI)  The patient has the following serious cardiovascular risks for perioperative complications:   - No serious cardiac risks = 0 points     RCRI Interpretation: 0 points: Class I (very low risk - 0.4% complication rate)         Signed Electronically by: Snaaz Hauser PA-C  A copy of this evaluation report is provided to the requesting physician.

## 2024-11-12 NOTE — PATIENT INSTRUCTIONS
How to Take Your Medication Before Surgery  Preoperative Medication Instructions   Antiplatelet or Anticoagulation Medication Instructions   - Patient is on no antiplatelet or anticoagulation medications.    Additional Medication Instructions  Take all scheduled medications on the day of surgery   - Herbal medications and vitamins: DO NOT TAKE 14 days prior to surgery.       Patient Education   Preparing for Your Surgery  For Adults  Getting started  In most cases, a nurse will call to review your health history and instructions. They will give you an arrival time based on your scheduled surgery time. Please be ready to share:  Your doctor's clinic name and phone number  Your medical, surgical, and anesthesia history  A list of allergies and sensitivities  A list of medicines, including herbal treatments and over-the-counter drugs  Whether the patient has a legal guardian (ask how to send us the papers in advance)  Note: You may not receive a call if you were seen at our PAC (Preoperative Assessment Center).  Please tell us if you're pregnant--or if there's any chance you might be pregnant. Some surgeries may injure a fetus (unborn baby), so they require a pregnancy test. Surgeries that are safe for a fetus don't always need a test, and you can choose whether to have one.   Preparing for surgery  Within 10 to 30 days of surgery: Have a pre-op exam (sometimes called an H&P, or History and Physical). This can be done at a clinic or pre-operative center.  If you're having a , you may not need this exam. Talk to your care team.  At your pre-op exam, talk to your care team about all medicines you take. (This includes CBD oil and any drugs, such as THC, marijuana, and other forms of cannabis.) If you need to stop any medicine before surgery, ask when to start taking it again.  This is for your safety. Many medicines and drugs can make you bleed too much during surgery. Some change how well surgery (anesthesia)  drugs work.  Call your insurance company to let them know you're having surgery. (If you don't have insurance, call [phone].)  Call your clinic if there's any change in your health. This includes a scrape or scratch near the surgery site, or any signs of a cold (sore throat, runny nose, cough, rash, fever).  Eating and drinking guidelines  For your safety: Unless your surgeon tells you otherwise, follow the guidelines below.  Eat and drink as normal until 8 hours before you arrive for surgery. After that, no food or milk. You can spit out gum when you arrive.  Drink clear liquids until 2 hours before you arrive. These are liquids you can see through, like water, Gatorade, and Propel Water. They also include plain black coffee and tea (no cream or milk).  No alcohol for 24 hours before you arrive. The night before surgery, stop any drinks that contain THC.  If your care team tells you to take medicine on the morning of surgery, it's okay to take it with a sip of water. No other medicines or drugs are allowed (including CBD oil)--follow your care team's instructions.  If you have questions the day of surgery, call your hospital or surgery center.   Preventing infection  Shower or bathe the night before and the morning of surgery. Follow the instructions your clinic gave you. (If no instructions, use regular soap.)  Don't shave or clip hair near your surgery site. We'll remove the hair if needed.  Don't smoke or vape the morning of surgery. No chewing tobacco for 6 hours before you arrive. A nicotine patch is okay. You may spit out nicotine gum when you arrive.  For some surgeries, the surgeon will tell you to fully quit smoking and nicotine.  We will make every effort to keep you safe from infection. We will:  Clean our hands often with soap and water (or an alcohol-based hand rub).  Clean the skin at your surgery site with a special soap that kills germs.  Give you a special gown to keep you warm. (Cold raises  the risk of infection.)  Wear hair covers, masks, gowns, and gloves during surgery.  Give antibiotic medicine, if prescribed. Not all surgeries need this medicine.  What to bring on the day of surgery  Photo ID and insurance card  Copy of your health care directive, if you have one  Glasses and hearing aids (bring cases)  You can't wear contacts during surgery  Inhaler and eye drops, if you use them (tell us about these when you arrive)  CPAP machine or breathing device, if you use them  A few personal items, if spending the night  If you have . . .  A pacemaker, ICD (cardiac defibrillator), or other implant: Bring the ID card.  An implanted stimulator: Bring the remote control.  A legal guardian: Bring a copy of the certified (court-stamped) guardianship papers.  Please remove any jewelry, including body piercings. Leave jewelry and other valuables at home.  If you're going home the day of surgery  You must have a responsible adult drive you home. They should stay with you overnight as well.  If you don't have someone to stay with you, and you aren't safe to go home alone, we may keep you overnight. Insurance often won't pay for this.  After surgery  If it's hard to control your pain or you need more pain medicine, please call your surgeon's office.  Questions?   If you have any questions for your care team, list them here:   ____________________________________________________________________________________________________________________________________________________________________________________________________________________________________________________________  For informational purposes only. Not to replace the advice of your health care provider. Copyright   2003, 2019 [facility]. All rights reserved. Clinically reviewed by Mikal Taylor MD. [employer] [id number] - REV 08/24.

## 2024-11-12 NOTE — RESULT ENCOUNTER NOTE
Dear Bang  Your electrolytes, blood sugar and kidney function were normal.    Your blood counts and hemoglobin were normal.    Good luck with surgery.  Please call or MyChart my office with any questions or concerns.   Sanaz Hauser, PAC

## 2024-11-14 ENCOUNTER — TELEPHONE (OUTPATIENT)
Dept: FAMILY MEDICINE | Facility: CLINIC | Age: 64
End: 2024-11-14
Payer: COMMERCIAL

## 2024-11-14 DIAGNOSIS — I77.810 MILD DILATION OF ASCENDING AORTA (H): Primary | ICD-10-CM

## 2024-11-14 NOTE — TELEPHONE ENCOUNTER
First attempt. Left message for patient to return call per provider message as noted:        RN, if/when patient returns call, please review message as shown. Usman Pan, RN, BSN

## 2024-11-14 NOTE — TELEPHONE ENCOUNTER
Please call and advise that I reviewed with Dr. Washburn and given that echocardiogram showed some dilation of aorta in 2018 and although has  seen cardiology I do recommend repeating echocardiogram and/or follow up with cardiology- will not prevent from surgery but needs to complete  May schedule at [facility] ([phone]) formerly called [facility].

## 2024-11-14 NOTE — TELEPHONE ENCOUNTER
Patient notified of provider message as noted:    Sanaz Hauser PA-C Physician Assistant Signed7:27 AM        Please call and advise that I reviewed with Dr. Washburn and given that echocardiogram showed some dilation of aorta in 2018 and although has  seen cardiology I do recommend repeating echocardiogram and/or follow up with cardiology- will not prevent from surgery but needs to complete  May schedule at [facility] ([phone]) formerly called [facility].            Patient verbalized understanding and agrees with plan. Advised to call with questions or concerns. Usman Pan, RN, BSN

## 2024-11-20 ENCOUNTER — ANCILLARY PROCEDURE (OUTPATIENT)
Dept: CARDIOLOGY | Facility: CLINIC | Age: 64
End: 2024-11-20
Attending: PHYSICIAN ASSISTANT
Payer: COMMERCIAL

## 2024-11-20 DIAGNOSIS — I77.810 MILD DILATION OF ASCENDING AORTA (H): ICD-10-CM

## 2024-11-20 LAB — LVEF ECHO: NORMAL

## 2024-11-20 PROCEDURE — 93306 TTE W/DOPPLER COMPLETE: CPT | Performed by: INTERNAL MEDICINE

## 2024-11-20 NOTE — RESULT ENCOUNTER NOTE
Dear Bang  Your echocardiogram looks unchanged from previous.  You do have some dilation of the aorta but nothing concerning at this time.  Please call or MyChart my office with any questions or concerns.   Sanaz Hauser, PAC

## 2025-02-26 ENCOUNTER — PATIENT OUTREACH (OUTPATIENT)
Dept: CARE COORDINATION | Facility: CLINIC | Age: 65
End: 2025-02-26
Payer: COMMERCIAL

## 2025-04-07 ENCOUNTER — ALLIED HEALTH/NURSE VISIT (OUTPATIENT)
Dept: RESEARCH | Facility: CLINIC | Age: 65
End: 2025-04-07
Payer: COMMERCIAL

## 2025-04-07 DIAGNOSIS — Z00.6 RESEARCH STUDY PATIENT: Primary | ICD-10-CM

## 2025-04-07 NOTE — PROGRESS NOTES
Stress II  Oxidative Stress Markers in Heart Failure Blood Test for Diastolic Dysfunction     IRB: [id number] PI: Albert Rodriguez Jr., M.D., PhD - Phone: [phone]  : Alcira Arciniega, CRC -Phone [phone], Kalpana Aly, CRC - Phone [phone]     Patient was approached for possible participation for the above study. Inclusion and exclusion criteria reviewed. Patient meets all of the inclusion criteria and does not meet any of the exclusion criteria. The current approved IRB consent form was discussed and explained to the patient.  It was discussed that involvement with the study is voluntary and refusal to participate would not involve penalty or decrease benefits at which the patient is entitled, and the subject may discontinue involvement at any time without penalty or loss in benefits. The consent form was reviewed including purpose, research hypothesis, nature of the research, risk & benefits. Also reviewed were confidentiality issues, compensation for injury, and alternative procedures available. The patient was given time to review and ask any questions about the consent. Patient was shown contact information for PI and study staff in consent for future questions. Patient verbalized understanding of consent and study by restating the purpose, procedures, duration, risk, confidentiality of PHI, and voluntarily participation. Questions and concerns were addressed. Patient printed, signed and dated the consent/HIPAA form prior to study involvement. A copy was given to the patient for their records.       Subject Consent/HIPAA: SIGNED ON 07 Apr 2025     Kalpana Aly

## 2025-04-12 SDOH — HEALTH STABILITY: PHYSICAL HEALTH: ON AVERAGE, HOW MANY DAYS PER WEEK DO YOU ENGAGE IN MODERATE TO STRENUOUS EXERCISE (LIKE A BRISK WALK)?: 6 DAYS

## 2025-04-12 SDOH — HEALTH STABILITY: PHYSICAL HEALTH: ON AVERAGE, HOW MANY MINUTES DO YOU ENGAGE IN EXERCISE AT THIS LEVEL?: 60 MIN

## 2025-04-12 ASSESSMENT — SOCIAL DETERMINANTS OF HEALTH (SDOH): HOW OFTEN DO YOU GET TOGETHER WITH FRIENDS OR RELATIVES?: TWICE A WEEK

## 2025-04-17 ENCOUNTER — OFFICE VISIT (OUTPATIENT)
Dept: FAMILY MEDICINE | Facility: CLINIC | Age: 65
End: 2025-04-17
Payer: COMMERCIAL

## 2025-04-17 VITALS
WEIGHT: 258.38 LBS | HEIGHT: 77 IN | OXYGEN SATURATION: 95 % | TEMPERATURE: 97.8 F | DIASTOLIC BLOOD PRESSURE: 83 MMHG | SYSTOLIC BLOOD PRESSURE: 133 MMHG | HEART RATE: 67 BPM | BODY MASS INDEX: 30.51 KG/M2 | RESPIRATION RATE: 16 BRPM

## 2025-04-17 DIAGNOSIS — Z13.220 SCREENING FOR LIPID DISORDERS: ICD-10-CM

## 2025-04-17 DIAGNOSIS — Z23 NEED FOR VACCINATION: ICD-10-CM

## 2025-04-17 DIAGNOSIS — Z13.1 SCREENING FOR DIABETES MELLITUS: ICD-10-CM

## 2025-04-17 DIAGNOSIS — N20.0 NEPHROLITHIASIS: ICD-10-CM

## 2025-04-17 DIAGNOSIS — Z12.5 SCREENING FOR PROSTATE CANCER: ICD-10-CM

## 2025-04-17 DIAGNOSIS — E66.9 OBESITY (BMI 30-39.9): ICD-10-CM

## 2025-04-17 DIAGNOSIS — Z00.00 MEDICARE ANNUAL WELLNESS VISIT, SUBSEQUENT: Primary | ICD-10-CM

## 2025-04-17 DIAGNOSIS — Z90.79 STATUS POST TRANSURETHRAL RESECTION OF PROSTATE: ICD-10-CM

## 2025-04-17 LAB
ALBUMIN SERPL BCG-MCNC: 4.4 G/DL (ref 3.5–5.2)
ALP SERPL-CCNC: 73 U/L (ref 40–150)
ALT SERPL W P-5'-P-CCNC: 17 U/L (ref 0–70)
ANION GAP SERPL CALCULATED.3IONS-SCNC: 10 MMOL/L (ref 7–15)
AST SERPL W P-5'-P-CCNC: 26 U/L (ref 0–45)
BILIRUB SERPL-MCNC: 0.5 MG/DL
BUN SERPL-MCNC: 15.7 MG/DL (ref 8–23)
CALCIUM SERPL-MCNC: 9.5 MG/DL (ref 8.8–10.4)
CHLORIDE SERPL-SCNC: 104 MMOL/L (ref 98–107)
CHOLEST SERPL-MCNC: 194 MG/DL
CREAT SERPL-MCNC: 1.06 MG/DL (ref 0.67–1.17)
EGFRCR SERPLBLD CKD-EPI 2021: 77 ML/MIN/1.73M2
ERYTHROCYTE [DISTWIDTH] IN BLOOD BY AUTOMATED COUNT: 12.9 % (ref 10–15)
EST. AVERAGE GLUCOSE BLD GHB EST-MCNC: 100 MG/DL
FASTING STATUS PATIENT QL REPORTED: YES
FASTING STATUS PATIENT QL REPORTED: YES
GLUCOSE SERPL-MCNC: 89 MG/DL (ref 70–99)
HBA1C MFR BLD: 5.1 % (ref 0–5.6)
HCO3 SERPL-SCNC: 25 MMOL/L (ref 22–29)
HCT VFR BLD AUTO: 44 % (ref 40–53)
HDLC SERPL-MCNC: 36 MG/DL
HGB BLD-MCNC: 14.9 G/DL (ref 13.3–17.7)
LDLC SERPL CALC-MCNC: 135 MG/DL
MCH RBC QN AUTO: 30.2 PG (ref 26.5–33)
MCHC RBC AUTO-ENTMCNC: 33.9 G/DL (ref 31.5–36.5)
MCV RBC AUTO: 89 FL (ref 78–100)
NONHDLC SERPL-MCNC: 158 MG/DL
PLATELET # BLD AUTO: 229 10E3/UL (ref 150–450)
POTASSIUM SERPL-SCNC: 4.1 MMOL/L (ref 3.4–5.3)
PROT SERPL-MCNC: 7.7 G/DL (ref 6.4–8.3)
PSA SERPL DL<=0.01 NG/ML-MCNC: 0.18 NG/ML (ref 0–4.5)
RBC # BLD AUTO: 4.94 10E6/UL (ref 4.4–5.9)
SODIUM SERPL-SCNC: 139 MMOL/L (ref 135–145)
TRIGL SERPL-MCNC: 115 MG/DL
WBC # BLD AUTO: 8.2 10E3/UL (ref 4–11)

## 2025-04-17 ASSESSMENT — PAIN SCALES - GENERAL: PAINLEVEL_OUTOF10: MILD PAIN (2)

## 2025-04-17 NOTE — PROGRESS NOTES
"Preventive Care Visit  [facility]  Melchor Calvin MD, Internal Medicine  Apr 17, 2025      Assessment & Plan     1.  Medicare annual wellness visit subsequent completed and is good  2.  Status post TURP with greenlight laser 12/5/2024 with good results.  3.  Obesity class I a BMI of 30.5.  4.  History of nephrolithiasis.  No recent issues.  5.  Need for vaccination.  Pneumovax 20 vaccine provided today.  6.  Screening for prostate cancer, lipid disorder and diabetes mellitus.    Patient will have A1c CBC CMP PSA lipids done today.  I will get back to him with the results.  He will return for annual physical exams.    Patient has been advised of split billing requirements and indicates understanding: Yes        BMI  Estimated body mass index is 30.64 kg/m  as calculated from the following:    Height as of this encounter: 1.956 m (6' 5\").    Weight as of this encounter: 117.2 kg (258 lb 6 oz).   Weight management plan: Discussed healthy diet and exercise guidelines    Counseling  Appropriate preventive services were addressed with this patient via screening, questionnaire, or discussion as appropriate for fall prevention, nutrition, physical activity, Tobacco-use cessation, social engagement, weight loss and cognition.  Checklist reviewing preventive services available has been given to the patient.  Reviewed patient's diet, addressing concerns and/or questions.       Subjective   Bang Donis is a 65 year old, presenting for the following:  Medicare Visit        4/17/2025     3:11 PM   Additional Questions   Roomed by Ludmila MARKHAM   Accompanied by self           HPI    65 gentleman has come in for annual physical examination.  He offers no concerns.  He had quite a bit symptomatic benign prostatic hypertrophy with lower urinary obstructive symptoms.  He underwent greenlight laser therapy in December 2018 for with great improvement.  He now wakes up once or twice a night to urinate.  He remains " very active.  Offers no new concerns.      Advance Care Planning    Discussed advance care planning with patient; however, patient declined at this time.        4/12/2025   General Health   How would you rate your overall physical health? Good   Feel stress (tense, anxious, or unable to sleep) Only a little   (!) STRESS CONCERN      4/12/2025   Nutrition   Diet: Regular (no restrictions)         4/12/2025   Exercise   Days per week of moderate/strenous exercise 6 days   Average minutes spent exercising at this level 60 min         4/12/2025   Social Factors   Frequency of gathering with friends or relatives Twice a week   Worry food won't last until get money to buy more No   Food not last or not have enough money for food? No   Do you have housing? (Housing is defined as stable permanent housing and does not include staying ouside in a car, in a tent, in an abandoned building, in an overnight shelter, or couch-surfing.) Yes   Are you worried about losing your housing? No   Lack of transportation? No   Unable to get utilities (heat,electricity)? No         4/12/2025   Fall Risk   Fallen 2 or more times in the past year? No   Trouble with walking or balance? No          4/12/2025   Activities of Daily Living- Home Safety   Needs help with the following daily activites None of the above   Safety concerns in the home None of the above         4/12/2025   Dental   Dentist two times every year? Yes         4/12/2025   Hearing Screening   Hearing concerns? None of the above         4/12/2025   Driving Risk Screening   Patient/family members have concerns about driving No         4/12/2025   General Alertness/Fatigue Screening   Have you been more tired than usual lately? No         4/12/2025   Urinary Incontinence Screening   Bothered by leaking urine in past 6 months No         Today's PHQ-2 Score:       4/17/2025     3:03 PM   PHQ-2 ( 1999 [employer])   Q1: Little interest or pleasure in doing things 0   Q2: Feeling down,  depressed or hopeless 0   PHQ-2 Score 0    Q1: Little interest or pleasure in doing things Not at all   Q2: Feeling down, depressed or hopeless Not at all   PHQ-2 Score 0       Patient-reported           4/12/2025   Substance Use   Alcohol more than 3/day or more than 7/wk No   Do you have a current opioid prescription? No   How severe/bad is pain from 1 to 10? 2/10   Do you use any other substances recreationally? No     Social History     Tobacco Use    Smoking status: Never    Smokeless tobacco: Never   Vaping Use    Vaping status: Never Used   Substance Use Topics    Alcohol use: Yes     Comment: very little use    Drug use: No           4/12/2025   AAA Screening   Family history of Abdominal Aortic Aneurysm (AAA)? No   Last PSA:   PSA   Date Value Ref Range Status   06/08/2018 0.42 0 - 4 ug/L Final     Comment:     Assay Method:  Chemiluminescence using Siemens Vista analyzer     ASCVD Risk   The 10-year ASCVD risk score (Salome GARZA, et al., 2019) is: 16.6%    Values used to calculate the score:      Age: 65 years      Sex: Male      Is Non- : No      Diabetic: No      Tobacco smoker: No      Systolic Blood Pressure: 133 mmHg      Is BP treated: No      HDL Cholesterol: 39 mg/dL      Total Cholesterol: 178 mg/dL            Reviewed and updated as needed this visit by Provider                    Past Medical History:   Diagnosis Date    Family history of colon cancer 02/21/2024    Nephrolithiasis 02/10/2022    Status post transurethral resection of prostate 12/05/2024    TUR P with the greenlight laser ablation     Past Surgical History:   Procedure Laterality Date    COLONOSCOPY  07/09/2012    Procedure: COLONOSCOPY;  Colonoscopy ;  Surgeon: Homar Leger MD;  Location: [facility] OR    COLONOSCOPY N/A 04/24/2024    Procedure: COLONOSCOPY, WITH POLYPECTOMY AND BIOPSY;  Surgeon: Laura Delgadillo MD;  Location: [facility]    COLONOSCOPY WITH CO2 INSUFFLATION N/A 04/24/2024    Procedure:  Colonoscopy with CO2 insufflation;  Surgeon: Laura Delgadillo MD;  Location: [facility]    LAPAROSCOPIC HERNIORRHAPHY INGUINAL BILATERAL Bilateral 2018    Procedure: LAPAROSCOPIC HERNIORRHAPHY INGUINAL BILATERAL;  Laparoscopic right inguinal hernia repair with mesh;  Surgeon: Jenna Akins MD;  Location: [facility] OR    TURP N/A 2024    turp with greenlight laser therapy     BP Readings from Last 3 Encounters:   25 133/83   24 132/83   24 135/83    Wt Readings from Last 3 Encounters:   25 117.2 kg (258 lb 6 oz)   24 116.9 kg (257 lb 12.8 oz)   24 102.4 kg (225 lb 12.8 oz)                  Patient Active Problem List   Diagnosis    CARDIOVASCULAR SCREENING; LDL GOAL LESS THAN 160    Obesity (BMI 30-39.9)    First degree AV block    Right groin hernia    Urinary frequency    Nephrolithiasis    Family history of colon cancer    BPH with obstruction/lower urinary tract symptoms     Past Surgical History:   Procedure Laterality Date    COLONOSCOPY  2012    Procedure: COLONOSCOPY;  Colonoscopy ;  Surgeon: Homar Leger MD;  Location: [facility]    COLONOSCOPY N/A 2024    Procedure: COLONOSCOPY, WITH POLYPECTOMY AND BIOPSY;  Surgeon: Laura Delgadillo MD;  Location: [facility]    COLONOSCOPY WITH CO2 INSUFFLATION N/A 2024    Procedure: Colonoscopy with CO2 insufflation;  Surgeon: Laura Delgadillo MD;  Location: [facility]    LAPAROSCOPIC HERNIORRHAPHY INGUINAL BILATERAL Bilateral 2018    Procedure: LAPAROSCOPIC HERNIORRHAPHY INGUINAL BILATERAL;  Laparoscopic right inguinal hernia repair with mesh;  Surgeon: Jenna Akins MD;  Location: [facility]    TURP N/A 2024    turp with greenlight laser therapy       Social History     Tobacco Use    Smoking status: Never    Smokeless tobacco: Never   Substance Use Topics    Alcohol use: Yes     Comment: very little use     Family History   Problem Relation Age of Onset    Other - See Comments Mother           "age 91    C.A.D. Father     Colon Cancer Father         in 80s    Colon Cancer Sister 63    Colon Cancer Sister     Colon Polyps Sister     Asthma Brother     Ulcerative Colitis Son     Diabetes No family hx of     Hypertension No family hx of     Cerebrovascular Disease No family hx of     Breast Cancer No family hx of     Cancer - colorectal No family hx of     Prostate Cancer No family hx of          No current outpatient medications on file.     No Known Allergies  Current providers sharing in care for this patient include:  Patient Care Team:  Melchor Calvin MD as PCP - General (Internal Medicine)  Melchor Calvin MD as Assigned PCP    The following health maintenance items are reviewed in Epic and correct as of today:  Health Maintenance   Topic Date Due    Pneumococcal Vaccine: 50+ Years (1 of 1 - PCV) Never done    RSV VACCINE (1 - Risk 60-74 years 1-dose series) Never done    INFLUENZA VACCINE (1) Never done    COVID-19 Vaccine (4 - 2024-25 season) 09/01/2024    MEDICARE ANNUAL WELLNESS VISIT  02/21/2025    ANNUAL REVIEW OF HM ORDERS  02/21/2025    ZOSTER IMMUNIZATION (2 of 2) 05/18/2025    FALL RISK ASSESSMENT  04/17/2026    DIABETES SCREENING  11/12/2027    LIPID  02/28/2029    COLORECTAL CANCER SCREENING  09/20/2029    ADVANCE CARE PLANNING  11/12/2029    DTAP/TDAP/TD IMMUNIZATION (2 - Td or Tdap) 11/08/2030    HEPATITIS C SCREENING  Completed    PHQ-2 (once per calendar year)  Completed    HPV IMMUNIZATION  Aged Out    MENINGITIS IMMUNIZATION  Aged Out         Review of Systems  Constitutional, HEENT, cardiovascular, pulmonary, GI, , musculoskeletal, neuro, skin, endocrine and psych systems are negative, except as otherwise noted.     Objective    Exam  /83 (BP Location: Right arm, Patient Position: Sitting, Cuff Size: Adult Large)   Pulse 67   Temp 97.8  F (36.6  C) (Oral)   Resp 16   Ht 1.956 m (6' 5\")   Wt 117.2 kg (258 lb 6 oz)   SpO2 95%   BMI 30.64 kg/m     Estimated body mass " "index is 30.64 kg/m  as calculated from the following:    Height as of this encounter: 1.956 m (6' 5\").    Weight as of this encounter: 117.2 kg (258 lb 6 oz).    Physical Exam  GENERAL: alert and no distress  EYES: Eyes grossly normal to inspection, PERRL and conjunctivae and sclerae normal  HENT: ear canals and TM's normal, nose and mouth without ulcers or lesions  NECK: no adenopathy, no asymmetry, masses, or scars  RESP: lungs clear to auscultation - no rales, rhonchi or wheezes  CV: regular rate and rhythm, normal S1 S2, no S3 or S4, no murmur, click or rub, no peripheral edema  ABDOMEN: soft, nontender, no hepatosplenomegaly, no masses and bowel sounds normal   (male): normal male genitalia without lesions or urethral discharge, no hernia  RECTAL: normal sphincter tone, no rectal masses, prostate normal size, smooth, nontender without nodules or masses  MS: no gross musculoskeletal defects noted, no edema  SKIN: no suspicious lesions or rashes  NEURO: Normal strength and tone, mentation intact and speech normal  PSYCH: mentation appears normal, affect normal/bright  LYMPH: no cervical, supraclavicular, axillary, or inguinal adenopathy        4/17/2025   Mini Cog   Clock Draw Score 2 Normal   3 Item Recall 3 objects recalled   Mini Cog Total Score 5             2/21/2024   Vision Screen   Vision Screen Results Pass       Signed Electronically by: Melchor Calvin MD    "

## 2025-04-17 NOTE — PROGRESS NOTES
Prior to immunization administration, verified patients identity using patient s name and date of birth. Please see Immunization Activity for additional information.     Screening Questionnaire for Adult Immunization    Are you sick today?   No   Do you have allergies to medications, food, a vaccine component or latex?   No   Have you ever had a serious reaction after receiving a vaccination?   No   Do you have a long-term health problem with heart, lung, kidney, or metabolic disease (e.g., diabetes), asthma, a blood disorder, no spleen, complement component deficiency, a cochlear implant, or a spinal fluid leak?  Are you on long-term aspirin therapy?   No   Do you have cancer, leukemia, HIV/AIDS, or any other immune system problem?   No   Do you have a parent, brother, or sister with an immune system problem?   No   In the past 3 months, have you taken medications that affect  your immune system, such as prednisone, other steroids, or anticancer drugs; drugs for the treatment of rheumatoid arthritis, Crohn s disease, or psoriasis; or have you had radiation treatments?   No   Have you had a seizure, or a brain or other nervous system problem?   No   During the past year, have you received a transfusion of blood or blood    products, or been given immune (gamma) globulin or antiviral drug?   No   For women: Are you pregnant or is there a chance you could become       pregnant during the next month?   No   Have you received any vaccinations in the past 4 weeks?   No     Immunization questionnaire answers were all negative.      Patient instructed to remain in clinic for 15 minutes afterwards, and to report any adverse reactions.     Screening performed by Harsha Ames MA on 4/17/2025 at 3:51 PM.

## (undated) DEVICE — DRSG STERI STRIP 1/2X4" R1547

## (undated) DEVICE — ANTIFOG SOLUTION W/FOAM PAD CF-1001

## (undated) DEVICE — NDL 19GA 1.5"

## (undated) DEVICE — SU VICRYL 4-0 PS-2 18" UND J496H

## (undated) DEVICE — TUBING INSUFFLATION W/FILTER CPC TO LUER 620-030-301

## (undated) DEVICE — PREP CHLORAPREP 26ML TINTED ORANGE  260815

## (undated) DEVICE — SOL WATER IRRIG 1000ML BOTTLE 07139-09

## (undated) DEVICE — ENDO SCOPE WARMER TM500

## (undated) DEVICE — ENDO TROCAR DISSECTING BALLOON OMS-PDBS2

## (undated) DEVICE — ENDO TROCAR BLUNT TIP KII BALLOON 12X100MM C0R47

## (undated) DEVICE — ENDO TROCAR BLADELESS 05X100MM B5LT

## (undated) DEVICE — KIT ENDO FIRST STEP DISINFECTANT 200ML W/POUCH EP-4

## (undated) DEVICE — PAD CHUX UNDERPAD 23X24" 7136

## (undated) DEVICE — DRAPE LAP W/ARMBOARD 29410

## (undated) DEVICE — DRSG TEGADERM 2 3/8X2 3/4" 1624W

## (undated) DEVICE — SU VICRYL 0 UR-6 27" J603H

## (undated) DEVICE — BLADE KNIFE SURG 11 371111

## (undated) DEVICE — PACK MINOR SBA15MIFSE

## (undated) DEVICE — GLOVE PROTEXIS W/NEU-THERA 6.0  2D73TE60

## (undated) RX ORDER — BUPIVACAINE HYDROCHLORIDE 2.5 MG/ML
INJECTION, SOLUTION INFILTRATION; PERINEURAL
Status: DISPENSED
Start: 2018-06-18

## (undated) RX ORDER — PROPOFOL 10 MG/ML
INJECTION, EMULSION INTRAVENOUS
Status: DISPENSED
Start: 2018-06-18

## (undated) RX ORDER — SIMETHICONE 40MG/0.6ML
SUSPENSION, DROPS(FINAL DOSAGE FORM)(ML) ORAL
Status: DISPENSED
Start: 2024-04-24

## (undated) RX ORDER — DEXAMETHASONE SODIUM PHOSPHATE 4 MG/ML
INJECTION, SOLUTION INTRA-ARTICULAR; INTRALESIONAL; INTRAMUSCULAR; INTRAVENOUS; SOFT TISSUE
Status: DISPENSED
Start: 2018-06-18

## (undated) RX ORDER — MELOXICAM 15 MG/1
TABLET ORAL
Status: DISPENSED
Start: 2018-06-18

## (undated) RX ORDER — EPHEDRINE SULFATE 50 MG/ML
INJECTION, SOLUTION INTRAVENOUS
Status: DISPENSED
Start: 2018-06-18

## (undated) RX ORDER — OXYCODONE HYDROCHLORIDE 5 MG/1
TABLET ORAL
Status: DISPENSED
Start: 2018-06-18

## (undated) RX ORDER — ACETAMINOPHEN 325 MG/1
TABLET ORAL
Status: DISPENSED
Start: 2018-06-18

## (undated) RX ORDER — FENTANYL CITRATE 50 UG/ML
INJECTION, SOLUTION INTRAMUSCULAR; INTRAVENOUS
Status: DISPENSED
Start: 2018-06-18

## (undated) RX ORDER — GABAPENTIN 300 MG/1
CAPSULE ORAL
Status: DISPENSED
Start: 2018-06-18

## (undated) RX ORDER — LIDOCAINE HYDROCHLORIDE 20 MG/ML
INJECTION, SOLUTION EPIDURAL; INFILTRATION; INTRACAUDAL; PERINEURAL
Status: DISPENSED
Start: 2018-06-18

## (undated) RX ORDER — FENTANYL CITRATE 50 UG/ML
INJECTION, SOLUTION INTRAMUSCULAR; INTRAVENOUS
Status: DISPENSED
Start: 2024-04-24